# Patient Record
Sex: MALE | Race: WHITE | NOT HISPANIC OR LATINO | URBAN - METROPOLITAN AREA
[De-identification: names, ages, dates, MRNs, and addresses within clinical notes are randomized per-mention and may not be internally consistent; named-entity substitution may affect disease eponyms.]

---

## 2023-03-02 ENCOUNTER — APPOINTMENT (OUTPATIENT)
Dept: URBAN - METROPOLITAN AREA CLINIC 195 | Age: 88
Setting detail: DERMATOLOGY
End: 2023-03-03

## 2023-03-02 VITALS
HEART RATE: 84 BPM | WEIGHT: 220 LBS | RESPIRATION RATE: 16 BRPM | SYSTOLIC BLOOD PRESSURE: 142 MMHG | DIASTOLIC BLOOD PRESSURE: 78 MMHG | TEMPERATURE: 97.2 F | HEIGHT: 70 IN

## 2023-03-02 VITALS — RESPIRATION RATE: 18 BRPM | HEART RATE: 80 BPM | DIASTOLIC BLOOD PRESSURE: 76 MMHG | SYSTOLIC BLOOD PRESSURE: 136 MMHG

## 2023-03-02 DIAGNOSIS — L57.8 OTHER SKIN CHANGES DUE TO CHRONIC EXPOSURE TO NONIONIZING RADIATION: ICD-10-CM

## 2023-03-02 DIAGNOSIS — Z12.83 ENCOUNTER FOR SCREENING FOR MALIGNANT NEOPLASM OF SKIN: ICD-10-CM

## 2023-03-02 PROBLEM — C44.1192 BASAL CELL CARCINOMA OF SKIN OF LEFT LOWER EYELID, INCLUDING CANTHUS: Status: ACTIVE | Noted: 2023-03-02

## 2023-03-02 PROCEDURE — OTHER COUNSELING: OTHER

## 2023-03-02 PROCEDURE — OTHER INCISIONAL BIOPSY WITH FROZEN SECTION: OTHER

## 2023-03-02 PROCEDURE — OTHER MIPS QUALITY: OTHER

## 2023-03-02 PROCEDURE — 88331 PATH CONSLTJ SURG 1 BLK 1SPC: CPT | Mod: 59

## 2023-03-02 PROCEDURE — 17311 MOHS 1 STAGE H/N/HF/G: CPT

## 2023-03-02 PROCEDURE — OTHER SURGICAL DECISION MAKING: OTHER

## 2023-03-02 PROCEDURE — OTHER MOHS SURGERY: OTHER

## 2023-03-02 PROCEDURE — OTHER ASC CONSULTATION: OTHER

## 2023-03-02 PROCEDURE — 99203 OFFICE O/P NEW LOW 30 MIN: CPT | Mod: 25

## 2023-03-02 PROCEDURE — 11106 INCAL BX SKN SINGLE LES: CPT | Mod: E2

## 2023-03-02 NOTE — PROCEDURE: MOHS SURGERY
Oculoplastic Surgeon Procedure Text (A): After obtaining clear surgical margins the patient was sent to oculoplastics for surgical repair.  The patient understands they will receive post-surgical care and follow-up from the referring physician, Dr. Nath at [facility]. Oculoplastic Surgeon Procedure Text (A): After obtaining clear surgical margins the patient was sent to oculoplastics for surgical repair.  The patient understands they will receive post-surgical care and follow-up from the referring physician, Dr. Nath at [facility].

## 2023-03-02 NOTE — HPI: SKIN LESION
How Severe Is Your Skin Lesion?: moderate
Has Your Skin Lesion Been Treated?: not been treated
Is This A New Presentation, Or A Follow-Up?: Skin Lesion Referral
Who Is Your Referring Provider?: Dr Daisha Nath

## 2023-03-02 NOTE — PROCEDURE: MIPS QUALITY
Detail Level: Detailed
Name And Contact Information For Health Care Proxy: Monet Zepeda , wife
Quality 226: Preventive Care And Screening: Tobacco Use: Screening And Cessation Intervention: Patient screened for tobacco use and is an ex/non-smoker
Quality 47: Advance Care Plan: Advance Care Planning discussed and documented; advance care plan or surrogate decision maker documented in the medical record.
Quality 138: Melanoma: Coordination Of Care: A treatment plan was communicated to the physicians providing continuing care within one month of diagnosis outlining: diagnosis, tumor thickness and a plan for surgery or alternate care.
Quality 130: Documentation Of Current Medications In The Medical Record: Current Medications Documented
Quality 137: Melanoma: Continuity Of Care - Recall System: Patient information entered into a recall system that includes: target date for the next exam specified AND a process to follow up with patients regarding missed or unscheduled appointments

## 2023-03-02 NOTE — PROCEDURE: MOHS SURGERY
"Dr Hewitt and PC nurse visited pt today.  Pt sleeping.  No non-verbal indication of pain.  Daughter, Melisa, in room.  Dr Hewitt had spoken with Dr Casarez who requested she visit pt today to discuss Goals of Care.  Dr Hewitt discussed with Melisa whether pt was undergoing dialysis.  Daughter reported pt has not been able to have dialysis because her \"blood pressures have been too low\".   Dr Hewitt explained it was time for some decisions to be made.  Dr Hewitt requested a family meeting tomorrow evening.  She asked Melisa if she thought pt's children would attend.  Melisa stated she would call them and felt that they would.   A tentative time of 6p tomorrow was decided.  I will follow up with Melisa tomorrow to confirm.  " Mustarde Flap Text: The defect edges were debeveled with a #15 scalpel blade.  Given the size, depth and location of the defect and the proximity to free margins a Mustarde flap was deemed most appropriate.  Using a sterile surgical marker, an appropriate flap was drawn incorporating the defect. The area thus outlined was incised with a #15 scalpel blade.  The skin margins were undermined to an appropriate distance in all directions utilizing iris scissors.

## 2023-03-02 NOTE — PROCEDURE: SURGICAL DECISION MAKING
Complexity (Necessary For Coding; Major - 90 Day Global With Some Exceptions; Minor - 10 Day Global): minor
Discussion: Decision for surgery refers to any surgeries performed today, or discussion of treatment in future.  In general, decision for repair is not made until the final extent of the surgical wound is known.
Date Of Surgery - Today Or Tomorrow?: today
Initial Decision For Surgery?: Yes
Risk Assessment Explanation (Does Not Render In The Note): Clinical determination of the probability and/or consequences of an event, such as surgery. Clinical assessment of the level of risk is affected by the nature of the event under consideration for the patient. Modifier 57 is used to indicate an Evaluation and Management (E/M) service resulted in the initial decision to perform surgery either the day before a major surgery (90 day global) or the day of a major surgery.

## 2023-03-02 NOTE — PROCEDURE: INCISIONAL BIOPSY WITH FROZEN SECTION
Notification Instructions: They were advised to call later that day for results.  If however the patient elected to stay, then biopsy results and management options were reviewed in the office same day. denies

## 2023-03-02 NOTE — PROCEDURE: MOHS SURGERY
Had colonoscopy done last year  Marielena Leung MD to get results and the interval to repeat Spiral Flap Text: The defect edges were debeveled with a #15 scalpel blade.  Given the location of the defect, shape of the defect and the proximity to free margins a spiral flap was deemed most appropriate.  Using a sterile surgical marker, an appropriate rotation flap was drawn incorporating the defect and placing the expected incisions within the relaxed skin tension lines where possible. The area thus outlined was incised deep to adipose tissue with a #15 scalpel blade.  The skin margins were undermined to an appropriate distance in all directions utilizing iris scissors.

## 2024-09-30 ENCOUNTER — APPOINTMENT (OUTPATIENT)
Dept: CT IMAGING | Age: 89
DRG: 551 | End: 2024-09-30
Payer: MEDICARE

## 2024-09-30 ENCOUNTER — APPOINTMENT (OUTPATIENT)
Dept: CT IMAGING | Age: 89
DRG: 551 | End: 2024-09-30
Attending: EMERGENCY MEDICINE
Payer: MEDICARE

## 2024-09-30 ENCOUNTER — HOSPITAL ENCOUNTER (INPATIENT)
Age: 89
LOS: 1 days | Discharge: HOSPICE | DRG: 551 | End: 2024-10-04
Attending: EMERGENCY MEDICINE | Admitting: INTERNAL MEDICINE
Payer: MEDICARE

## 2024-09-30 DIAGNOSIS — S12.100A CLOSED ODONTOID FRACTURE, INITIAL ENCOUNTER (HCC): Primary | ICD-10-CM

## 2024-09-30 DIAGNOSIS — I63.9 CEREBROVASCULAR ACCIDENT (CVA), UNSPECIFIED MECHANISM (HCC): ICD-10-CM

## 2024-09-30 PROBLEM — R29.90 STROKE-LIKE SYMPTOM: Status: ACTIVE | Noted: 2024-09-30

## 2024-09-30 LAB
ALBUMIN SERPL-MCNC: 3.4 G/DL (ref 3.5–5.2)
ALP SERPL-CCNC: 69 U/L (ref 40–129)
ALT SERPL-CCNC: 15 U/L (ref 0–40)
AMMONIA PLAS-SCNC: 14 UMOL/L (ref 16–60)
ANION GAP SERPL CALCULATED.3IONS-SCNC: 14 MMOL/L (ref 7–16)
ANION GAP SERPL CALCULATED.3IONS-SCNC: 15 MMOL/L (ref 7–16)
AST SERPL-CCNC: 27 U/L (ref 0–39)
BASOPHILS # BLD: 0.03 K/UL (ref 0–0.2)
BASOPHILS NFR BLD: 1 % (ref 0–2)
BILIRUB SERPL-MCNC: 0.5 MG/DL (ref 0–1.2)
BUN SERPL-MCNC: 35 MG/DL (ref 6–23)
BUN SERPL-MCNC: 35 MG/DL (ref 6–23)
CALCIUM SERPL-MCNC: 9.5 MG/DL (ref 8.6–10.2)
CALCIUM SERPL-MCNC: 9.5 MG/DL (ref 8.6–10.2)
CHLORIDE SERPL-SCNC: 105 MMOL/L (ref 98–107)
CHLORIDE SERPL-SCNC: 106 MMOL/L (ref 98–107)
CO2 SERPL-SCNC: 23 MMOL/L (ref 22–29)
CO2 SERPL-SCNC: 25 MMOL/L (ref 22–29)
CREAT SERPL-MCNC: 2.7 MG/DL (ref 0.7–1.2)
CREAT SERPL-MCNC: 2.8 MG/DL (ref 0.7–1.2)
EOSINOPHIL # BLD: 0.09 K/UL (ref 0.05–0.5)
EOSINOPHILS RELATIVE PERCENT: 2 % (ref 0–6)
ERYTHROCYTE [DISTWIDTH] IN BLOOD BY AUTOMATED COUNT: 13 % (ref 11.5–15)
ERYTHROCYTE [DISTWIDTH] IN BLOOD BY AUTOMATED COUNT: 13.2 % (ref 11.5–15)
GFR, ESTIMATED: 21 ML/MIN/1.73M2
GFR, ESTIMATED: 22 ML/MIN/1.73M2
GLUCOSE SERPL-MCNC: 105 MG/DL (ref 74–99)
GLUCOSE SERPL-MCNC: 105 MG/DL (ref 74–99)
HCT VFR BLD AUTO: 32.6 % (ref 37–54)
HCT VFR BLD AUTO: 34.3 % (ref 37–54)
HGB BLD-MCNC: 10.7 G/DL (ref 12.5–16.5)
HGB BLD-MCNC: 11 G/DL (ref 12.5–16.5)
IMM GRANULOCYTES # BLD AUTO: <0.03 K/UL (ref 0–0.58)
IMM GRANULOCYTES NFR BLD: 0 % (ref 0–5)
LYMPHOCYTES NFR BLD: 1.17 K/UL (ref 1.5–4)
LYMPHOCYTES RELATIVE PERCENT: 25 % (ref 20–42)
MCH RBC QN AUTO: 33.2 PG (ref 26–35)
MCH RBC QN AUTO: 33.5 PG (ref 26–35)
MCHC RBC AUTO-ENTMCNC: 32.1 G/DL (ref 32–34.5)
MCHC RBC AUTO-ENTMCNC: 32.8 G/DL (ref 32–34.5)
MCV RBC AUTO: 102.2 FL (ref 80–99.9)
MCV RBC AUTO: 103.6 FL (ref 80–99.9)
MONOCYTES NFR BLD: 0.35 K/UL (ref 0.1–0.95)
MONOCYTES NFR BLD: 8 % (ref 2–12)
NEUTROPHILS NFR BLD: 65 % (ref 43–80)
NEUTS SEG NFR BLD: 3.04 K/UL (ref 1.8–7.3)
PLATELET # BLD AUTO: 168 K/UL (ref 130–450)
PLATELET # BLD AUTO: 174 K/UL (ref 130–450)
PMV BLD AUTO: 10.8 FL (ref 7–12)
PMV BLD AUTO: 10.9 FL (ref 7–12)
POTASSIUM SERPL-SCNC: 3.5 MMOL/L (ref 3.5–5)
POTASSIUM SERPL-SCNC: 3.7 MMOL/L (ref 3.5–5)
PROT SERPL-MCNC: 7.5 G/DL (ref 6.4–8.3)
RBC # BLD AUTO: 3.19 M/UL (ref 3.8–5.8)
RBC # BLD AUTO: 3.31 M/UL (ref 3.8–5.8)
SODIUM SERPL-SCNC: 144 MMOL/L (ref 132–146)
SODIUM SERPL-SCNC: 144 MMOL/L (ref 132–146)
TSH SERPL DL<=0.05 MIU/L-ACNC: 2.17 UIU/ML (ref 0.27–4.2)
WBC OTHER # BLD: 4.7 K/UL (ref 4.5–11.5)
WBC OTHER # BLD: 5.7 K/UL (ref 4.5–11.5)

## 2024-09-30 PROCEDURE — 70498 CT ANGIOGRAPHY NECK: CPT

## 2024-09-30 PROCEDURE — 80048 BASIC METABOLIC PNL TOTAL CA: CPT

## 2024-09-30 PROCEDURE — 70496 CT ANGIOGRAPHY HEAD: CPT

## 2024-09-30 PROCEDURE — 2580000003 HC RX 258: Performed by: EMERGENCY MEDICINE

## 2024-09-30 PROCEDURE — 85027 COMPLETE CBC AUTOMATED: CPT

## 2024-09-30 PROCEDURE — 99285 EMERGENCY DEPT VISIT HI MDM: CPT

## 2024-09-30 PROCEDURE — 70450 CT HEAD/BRAIN W/O DYE: CPT

## 2024-09-30 PROCEDURE — 99223 1ST HOSP IP/OBS HIGH 75: CPT | Performed by: SURGERY

## 2024-09-30 PROCEDURE — 6360000002 HC RX W HCPCS: Performed by: EMERGENCY MEDICINE

## 2024-09-30 PROCEDURE — 99222 1ST HOSP IP/OBS MODERATE 55: CPT | Performed by: NEUROLOGICAL SURGERY

## 2024-09-30 PROCEDURE — 80053 COMPREHEN METABOLIC PANEL: CPT

## 2024-09-30 PROCEDURE — 0042T CT BRAIN PERFUSION: CPT

## 2024-09-30 PROCEDURE — G0378 HOSPITAL OBSERVATION PER HR: HCPCS

## 2024-09-30 PROCEDURE — 93005 ELECTROCARDIOGRAM TRACING: CPT | Performed by: EMERGENCY MEDICINE

## 2024-09-30 PROCEDURE — 96372 THER/PROPH/DIAG INJ SC/IM: CPT

## 2024-09-30 PROCEDURE — 6360000004 HC RX CONTRAST MEDICATION: Performed by: RADIOLOGY

## 2024-09-30 PROCEDURE — 72125 CT NECK SPINE W/O DYE: CPT

## 2024-09-30 RX ORDER — HALOPERIDOL 5 MG/ML
10 INJECTION INTRAMUSCULAR ONCE
Status: COMPLETED | OUTPATIENT
Start: 2024-09-30 | End: 2024-09-30

## 2024-09-30 RX ORDER — 0.9 % SODIUM CHLORIDE 0.9 %
1000 INTRAVENOUS SOLUTION INTRAVENOUS ONCE
Status: COMPLETED | OUTPATIENT
Start: 2024-09-30 | End: 2024-09-30

## 2024-09-30 RX ORDER — TAMSULOSIN HYDROCHLORIDE 0.4 MG/1
0.4 CAPSULE ORAL DAILY
COMMUNITY

## 2024-09-30 RX ORDER — IOPAMIDOL 755 MG/ML
100 INJECTION, SOLUTION INTRAVASCULAR
Status: COMPLETED | OUTPATIENT
Start: 2024-09-30 | End: 2024-09-30

## 2024-09-30 RX ORDER — DIVALPROEX SODIUM 125 MG/1
125 TABLET, DELAYED RELEASE ORAL 3 TIMES DAILY
Status: ON HOLD | COMMUNITY
End: 2024-10-04 | Stop reason: HOSPADM

## 2024-09-30 RX ORDER — PANTOPRAZOLE SODIUM 40 MG/1
40 TABLET, DELAYED RELEASE ORAL DAILY
COMMUNITY

## 2024-09-30 RX ORDER — LORAZEPAM 2 MG/ML
2 INJECTION INTRAMUSCULAR ONCE
Status: COMPLETED | OUTPATIENT
Start: 2024-09-30 | End: 2024-09-30

## 2024-09-30 RX ORDER — LEVETIRACETAM 500 MG/1
500 TABLET ORAL DAILY
Status: ON HOLD | COMMUNITY
End: 2024-10-04 | Stop reason: HOSPADM

## 2024-09-30 RX ORDER — ACETAMINOPHEN 325 MG/1
650 TABLET ORAL EVERY 4 HOURS PRN
COMMUNITY

## 2024-09-30 RX ORDER — MIRTAZAPINE 7.5 MG/1
7.5 TABLET, FILM COATED ORAL NIGHTLY
COMMUNITY

## 2024-09-30 RX ORDER — LORAZEPAM 0.5 MG/1
0.5 TABLET ORAL EVERY 4 HOURS PRN
Status: ON HOLD | COMMUNITY
End: 2024-10-04 | Stop reason: HOSPADM

## 2024-09-30 RX ADMIN — SODIUM CHLORIDE 1000 ML: 9 INJECTION, SOLUTION INTRAVENOUS at 22:00

## 2024-09-30 RX ADMIN — IOPAMIDOL 100 ML: 755 INJECTION, SOLUTION INTRAVENOUS at 20:58

## 2024-09-30 RX ADMIN — LORAZEPAM 2 MG: 2 INJECTION INTRAMUSCULAR; INTRAVENOUS at 23:41

## 2024-09-30 RX ADMIN — HALOPERIDOL LACTATE 10 MG: 5 INJECTION, SOLUTION INTRAMUSCULAR at 21:20

## 2024-09-30 RX ADMIN — HALOPERIDOL LACTATE 10 MG: 5 INJECTION, SOLUTION INTRAMUSCULAR at 23:39

## 2024-09-30 ASSESSMENT — PAIN - FUNCTIONAL ASSESSMENT
PAIN_FUNCTIONAL_ASSESSMENT: NONE - DENIES PAIN
PAIN_FUNCTIONAL_ASSESSMENT: NONE - DENIES PAIN

## 2024-09-30 NOTE — PROGRESS NOTES
Call placed to [facility]. Spoke to nursing staff familiar with patient. Jorge Luis usually ambulates independently and frequently making rounds in the halls, his speech is usually clear, with no facial droop. He is able to follow commands even though his baseline is Alert and Oriented to Self only. Patient's pupils are equal round, and reactive to light and accommodation.     Estrellita Moctezuma RN, BSN

## 2024-09-30 NOTE — CONSULTS
Normal  Right arm Normal  Left leg Normal  Right leg Normal    Capillary refill   Left arm normal  Right arm normal  Left leg normal   Right leg normal      Procedures in ED:  None    Radiology:     CT CERVICAL SPINE WO CONTRAST    Result Date: 9/30/2024  EXAMINATION: CT OF THE CERVICAL SPINE WITHOUT CONTRAST 9/30/2024 3:21 pm TECHNIQUE: CT of the cervical spine was performed without the administration of intravenous contrast. Multiplanar reformatted images are provided for review. Automated exposure control, iterative reconstruction, and/or weight based adjustment of the mA/kV was utilized to reduce the radiation dose to as low as reasonably achievable. COMPARISON: None. HISTORY: ORDERING SYSTEM PROVIDED HISTORY: ro fx TECHNOLOGIST PROVIDED HISTORY: Reason for exam:->ro fx Decision Support Exception - unselect if not a suspected or confirmed emergency medical condition->Emergency Medical Condition (MA) What reading provider will be dictating this exam?->CRC FINDINGS: BONES/ALIGNMENT: Type 3 odontoid fracture.  No significant displacement. DEGENERATIVE CHANGES: Multilevel degenerative changes.  Diffuse idiopathic skeletal hyperostosis.  Osteopenia. SOFT TISSUES: There is no prevertebral soft tissue swelling.     Type 3 odontoid fracture.     CT HEAD WO CONTRAST    Result Date: 9/30/2024  EXAMINATION: CT OF THE HEAD WITHOUT CONTRAST  9/30/2024 3:21 pm TECHNIQUE: CT of the head was performed without the administration of intravenous contrast. Automated exposure control, iterative reconstruction, and/or weight based adjustment of the mA/kV was utilized to reduce the radiation dose to as low as reasonably achievable. COMPARISON: None. HISTORY: ORDERING SYSTEM PROVIDED HISTORY: fall at [facility] TECHNOLOGIST PROVIDED HISTORY: Has a \"code stroke\" or \"stroke alert\" been called?->No Reason for exam:->fall at [facility] Decision Support Exception - unselect if not a suspected or confirmed emergency medical condition->Emergency Medical

## 2024-09-30 NOTE — PROGRESS NOTES
Updated SROC as well as Dr. Marti of patient's assessment findings not as his baseline.     Estrellita Moctezuma RN, BSN .

## 2024-09-30 NOTE — ED PROVIDER NOTES
89-year-old male presenting from nursing facility.  He has dementia.  Is oriented to his person only.  He otherwise cannot provide any history and no reliable information about why he is in the emergency department today.  No distress, awake and alert, wearing cervical collar at this time.   nursing home reported that he had a fall at the facility and sent in for evaluation.         No family history on file.  No past surgical history on file.    Review of Systems   Unable to perform ROS: Dementia        Physical Exam  Constitutional:       General: He is not in acute distress.     Appearance: He is well-developed.   HENT:      Head: Normocephalic and atraumatic.   Eyes:      Pupils: Pupils are equal, round, and reactive to light.   Neck:      Thyroid: No thyromegaly.      Comments: Cervical collar in place  Cardiovascular:      Rate and Rhythm: Normal rate and regular rhythm.   Pulmonary:      Effort: Pulmonary effort is normal. No respiratory distress.      Breath sounds: Normal breath sounds. No wheezing.   Abdominal:      General: There is no distension.      Palpations: Abdomen is soft. There is no mass.      Tenderness: There is no abdominal tenderness. There is no guarding or rebound.   Musculoskeletal:         General: No tenderness. Normal range of motion.      Comments: Left hand amputation previously   Skin:     General: Skin is warm and dry.      Findings: No erythema.   Neurological:      Mental Status: He is alert. Mental status is at baseline. He is disoriented.      Cranial Nerves: No cranial nerve deficit.   Psychiatric:         Mood and Affect: Mood normal.          Procedures     MDM       History From: Nursing home    CC/HPI Summary, DDx, ED Course, Reassessment, Tests Considered, Patient expectation:   89-year-old male presenting from the nursing home.  Had a fall.  Is in a cervical collar upon arrival.  He is able to tell me his name only and cannot provide any other verbal information.  He  CTA of the neck shows high-grade stenosis of the origin of the dominant   left vertebral artery.   4. The tiny caliber non dominant right vertebral artery is occluded at its   origin and in the mid cervical spine.  Reconstitution at the C3 level with   flow extending from the C3 level into the brain.   5. 20% stenosis of the proximal right internal carotid artery using NASCET   criteria.   6. 50% stenosis of the proximal left internal carotid artery using NASCET   criteria.   7. No perfusion abnormality.         CTA HEAD W CONTRAST   Final Result   1. CTA of the head chest focal 50% stenosis of the proximal end of an M2   branch of the left MCA.   2. No additional stenosis or occlusion of the proximal intracranial arteries.   3. CTA of the neck shows high-grade stenosis of the origin of the dominant   left vertebral artery.   4. The tiny caliber non dominant right vertebral artery is occluded at its   origin and in the mid cervical spine.  Reconstitution at the C3 level with   flow extending from the C3 level into the brain.   5. 20% stenosis of the proximal right internal carotid artery using NASCET   criteria.   6. 50% stenosis of the proximal left internal carotid artery using NASCET   criteria.   7. No perfusion abnormality.         CT BRAIN PERFUSION   Final Result   1. CTA of the head chest focal 50% stenosis of the proximal end of an M2   branch of the left MCA.   2. No additional stenosis or occlusion of the proximal intracranial arteries.   3. CTA of the neck shows high-grade stenosis of the origin of the dominant   left vertebral artery.   4. The tiny caliber non dominant right vertebral artery is occluded at its   origin and in the mid cervical spine.  Reconstitution at the C3 level with   flow extending from the C3 level into the brain.   5. 20% stenosis of the proximal right internal carotid artery using NASCET   criteria.   6. 50% stenosis of the proximal left internal carotid artery using NASCET

## 2024-10-01 ENCOUNTER — APPOINTMENT (OUTPATIENT)
Age: 89
DRG: 551 | End: 2024-10-01
Attending: FAMILY MEDICINE
Payer: MEDICARE
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
## 2024-10-01 LAB
ECHO AO ASC DIAM: 3.3 CM
ECHO AO ASCENDING AORTA INDEX: 1.58 CM/M2
ECHO AV AREA PEAK VELOCITY: 3.2 CM2
ECHO AV AREA VTI: 3.6 CM2
ECHO AV AREA/BSA PEAK VELOCITY: 1.5 CM2/M2
ECHO AV AREA/BSA VTI: 1.7 CM2/M2
ECHO AV CUSP MM: 2 CM
ECHO AV MEAN GRADIENT: 5 MMHG
ECHO AV MEAN VELOCITY: 1 M/S
ECHO AV PEAK GRADIENT: 8 MMHG
ECHO AV PEAK VELOCITY: 1.4 M/S
ECHO AV VELOCITY RATIO: 0.71
ECHO AV VTI: 26.8 CM
ECHO BSA: 2.14 M2
ECHO LA DIAMETER INDEX: 1.34 CM/M2
ECHO LA DIAMETER: 2.8 CM
ECHO LA VOL A-L A2C: 33 ML (ref 18–58)
ECHO LA VOL A-L A4C: 33 ML (ref 18–58)
ECHO LA VOL BP: 37 ML (ref 18–58)
ECHO LA VOL MOD A2C: 31 ML (ref 18–58)
ECHO LA VOL MOD A4C: 31 ML (ref 18–58)
ECHO LA VOL/BSA BIPLANE: 18 ML/M2 (ref 16–34)
ECHO LA VOLUME AREA LENGTH: 39 ML
ECHO LA VOLUME INDEX A-L A2C: 16 ML/M2 (ref 16–34)
ECHO LA VOLUME INDEX A-L A4C: 16 ML/M2 (ref 16–34)
ECHO LA VOLUME INDEX AREA LENGTH: 19 ML/M2 (ref 16–34)
ECHO LA VOLUME INDEX MOD A2C: 15 ML/M2 (ref 16–34)
ECHO LA VOLUME INDEX MOD A4C: 15 ML/M2 (ref 16–34)
ECHO LV EF PHYSICIAN: 55 %
ECHO LV FRACTIONAL SHORTENING: 28 % (ref 28–44)
ECHO LV INTERNAL DIMENSION DIASTOLE INDEX: 2.2 CM/M2
ECHO LV INTERNAL DIMENSION DIASTOLIC: 4.6 CM (ref 4.2–5.9)
ECHO LV INTERNAL DIMENSION SYSTOLIC INDEX: 1.58 CM/M2
ECHO LV INTERNAL DIMENSION SYSTOLIC: 3.3 CM
ECHO LV IVSD: 1.3 CM (ref 0.6–1)
ECHO LV MASS 2D: 205 G (ref 88–224)
ECHO LV MASS INDEX 2D: 98.1 G/M2 (ref 49–115)
ECHO LV POSTERIOR WALL DIASTOLIC: 1.1 CM (ref 0.6–1)
ECHO LV RELATIVE WALL THICKNESS RATIO: 0.48
ECHO LVOT AREA: 4.5 CM2
ECHO LVOT AV VTI INDEX: 0.78
ECHO LVOT DIAM: 2.4 CM
ECHO LVOT MEAN GRADIENT: 2 MMHG
ECHO LVOT PEAK GRADIENT: 4 MMHG
ECHO LVOT PEAK VELOCITY: 1 M/S
ECHO LVOT STROKE VOLUME INDEX: 45 ML/M2
ECHO LVOT SV: 94 ML
ECHO LVOT VTI: 20.8 CM
ECHO MV AREA PHT: 5.2 CM2
ECHO MV AREA VTI: 4.3 CM2
ECHO MV E DECELERATION TIME (DT): 103.9 MS
ECHO MV LVOT VTI INDEX: 1.06
ECHO MV MAX VELOCITY: 1.7 M/S
ECHO MV MEAN GRADIENT: 4 MMHG
ECHO MV MEAN VELOCITY: 0.9 M/S
ECHO MV PEAK GRADIENT: 11 MMHG
ECHO MV PRESSURE HALF TIME (PHT): 42 MS
ECHO MV VTI: 22.1 CM
ECHO PV MAX VELOCITY: 1.1 M/S
ECHO PV MEAN GRADIENT: 3 MMHG
ECHO PV MEAN VELOCITY: 0.8 M/S
ECHO PV PEAK GRADIENT: 4 MMHG
ECHO PV VTI: 15.6 CM
ECHO RV TAPSE: 1.8 CM (ref 1.7–?)
EKG ATRIAL RATE: 93 BPM
EKG P AXIS: 56 DEGREES
EKG P-R INTERVAL: 150 MS
EKG Q-T INTERVAL: 410 MS
EKG QRS DURATION: 140 MS
EKG QTC CALCULATION (BAZETT): 509 MS
EKG R AXIS: -59 DEGREES
EKG T AXIS: -2 DEGREES
EKG VENTRICULAR RATE: 93 BPM
FOLATE SERPL-MCNC: 7.9 NG/ML (ref 4.8–24.2)
T PALLIDUM AB SER QL HA: NONREACTIVE
VIT B12 SERPL-MCNC: 357 PG/ML (ref 211–946)

## 2024-10-01 PROCEDURE — G0378 HOSPITAL OBSERVATION PER HR: HCPCS

## 2024-10-01 PROCEDURE — 2580000003 HC RX 258: Performed by: FAMILY MEDICINE

## 2024-10-01 PROCEDURE — 6370000000 HC RX 637 (ALT 250 FOR IP): Performed by: FAMILY MEDICINE

## 2024-10-01 PROCEDURE — 96372 THER/PROPH/DIAG INJ SC/IM: CPT

## 2024-10-01 PROCEDURE — 93306 TTE W/DOPPLER COMPLETE: CPT | Performed by: INTERNAL MEDICINE

## 2024-10-01 PROCEDURE — 6360000002 HC RX W HCPCS: Performed by: FAMILY MEDICINE

## 2024-10-01 PROCEDURE — 93010 ELECTROCARDIOGRAM REPORT: CPT | Performed by: INTERNAL MEDICINE

## 2024-10-01 PROCEDURE — 99222 1ST HOSP IP/OBS MODERATE 55: CPT | Performed by: SURGERY

## 2024-10-01 PROCEDURE — 93306 TTE W/DOPPLER COMPLETE: CPT

## 2024-10-01 PROCEDURE — 92523 SPEECH SOUND LANG COMPREHEN: CPT

## 2024-10-01 PROCEDURE — 99222 1ST HOSP IP/OBS MODERATE 55: CPT | Performed by: FAMILY MEDICINE

## 2024-10-01 RX ORDER — ATORVASTATIN CALCIUM 40 MG/1
80 TABLET, FILM COATED ORAL NIGHTLY
Status: DISCONTINUED | OUTPATIENT
Start: 2024-10-01 | End: 2024-10-04 | Stop reason: HOSPADM

## 2024-10-01 RX ORDER — SODIUM CHLORIDE 9 MG/ML
INJECTION, SOLUTION INTRAVENOUS CONTINUOUS
Status: ACTIVE | OUTPATIENT
Start: 2024-10-01 | End: 2024-10-01

## 2024-10-01 RX ORDER — VITAMIN B COMPLEX
1000 TABLET ORAL DAILY
Status: DISCONTINUED | OUTPATIENT
Start: 2024-10-01 | End: 2024-10-04 | Stop reason: HOSPADM

## 2024-10-01 RX ORDER — LORAZEPAM 0.5 MG/1
0.5 TABLET ORAL EVERY 4 HOURS PRN
Status: DISCONTINUED | OUTPATIENT
Start: 2024-10-01 | End: 2024-10-02

## 2024-10-01 RX ORDER — ASPIRIN 81 MG/1
81 TABLET, CHEWABLE ORAL DAILY
Status: DISCONTINUED | OUTPATIENT
Start: 2024-10-01 | End: 2024-10-04 | Stop reason: HOSPADM

## 2024-10-01 RX ORDER — POLYETHYLENE GLYCOL 3350 17 G/17G
17 POWDER, FOR SOLUTION ORAL DAILY PRN
Status: DISCONTINUED | OUTPATIENT
Start: 2024-10-01 | End: 2024-10-04 | Stop reason: HOSPADM

## 2024-10-01 RX ORDER — ENOXAPARIN SODIUM 100 MG/ML
30 INJECTION SUBCUTANEOUS DAILY
Status: DISCONTINUED | OUTPATIENT
Start: 2024-10-01 | End: 2024-10-04 | Stop reason: HOSPADM

## 2024-10-01 RX ORDER — PANTOPRAZOLE SODIUM 40 MG/1
40 TABLET, DELAYED RELEASE ORAL DAILY
Status: DISCONTINUED | OUTPATIENT
Start: 2024-10-01 | End: 2024-10-04 | Stop reason: HOSPADM

## 2024-10-01 RX ORDER — ONDANSETRON 4 MG/1
4 TABLET, ORALLY DISINTEGRATING ORAL EVERY 8 HOURS PRN
Status: DISCONTINUED | OUTPATIENT
Start: 2024-10-01 | End: 2024-10-04 | Stop reason: HOSPADM

## 2024-10-01 RX ORDER — SODIUM CHLORIDE 9 MG/ML
INJECTION, SOLUTION INTRAVENOUS PRN
Status: DISCONTINUED | OUTPATIENT
Start: 2024-10-01 | End: 2024-10-04 | Stop reason: HOSPADM

## 2024-10-01 RX ORDER — MIRTAZAPINE 15 MG/1
7.5 TABLET, FILM COATED ORAL NIGHTLY
Status: DISCONTINUED | OUTPATIENT
Start: 2024-10-01 | End: 2024-10-04 | Stop reason: HOSPADM

## 2024-10-01 RX ORDER — TAMSULOSIN HYDROCHLORIDE 0.4 MG/1
0.4 CAPSULE ORAL DAILY
Status: DISCONTINUED | OUTPATIENT
Start: 2024-10-01 | End: 2024-10-04 | Stop reason: HOSPADM

## 2024-10-01 RX ORDER — SODIUM CHLORIDE 0.9 % (FLUSH) 0.9 %
5-40 SYRINGE (ML) INJECTION EVERY 12 HOURS SCHEDULED
Status: DISCONTINUED | OUTPATIENT
Start: 2024-10-01 | End: 2024-10-04 | Stop reason: HOSPADM

## 2024-10-01 RX ORDER — LEVETIRACETAM 500 MG/1
500 TABLET ORAL DAILY
Status: DISCONTINUED | OUTPATIENT
Start: 2024-10-01 | End: 2024-10-02

## 2024-10-01 RX ORDER — DIVALPROEX SODIUM 125 MG/1
125 TABLET, DELAYED RELEASE ORAL 3 TIMES DAILY
Status: DISCONTINUED | OUTPATIENT
Start: 2024-10-01 | End: 2024-10-04 | Stop reason: HOSPADM

## 2024-10-01 RX ORDER — ASPIRIN 300 MG/1
300 SUPPOSITORY RECTAL DAILY
Status: DISCONTINUED | OUTPATIENT
Start: 2024-10-01 | End: 2024-10-04 | Stop reason: HOSPADM

## 2024-10-01 RX ORDER — SODIUM CHLORIDE 0.9 % (FLUSH) 0.9 %
5-40 SYRINGE (ML) INJECTION PRN
Status: DISCONTINUED | OUTPATIENT
Start: 2024-10-01 | End: 2024-10-04 | Stop reason: HOSPADM

## 2024-10-01 RX ORDER — ONDANSETRON 2 MG/ML
4 INJECTION INTRAMUSCULAR; INTRAVENOUS EVERY 6 HOURS PRN
Status: DISCONTINUED | OUTPATIENT
Start: 2024-10-01 | End: 2024-10-04 | Stop reason: HOSPADM

## 2024-10-01 RX ADMIN — ATORVASTATIN CALCIUM 80 MG: 40 TABLET, FILM COATED ORAL at 21:14

## 2024-10-01 RX ADMIN — LORAZEPAM 0.5 MG: 0.5 TABLET ORAL at 21:25

## 2024-10-01 RX ADMIN — SODIUM CHLORIDE 1000 ML: 9 INJECTION, SOLUTION INTRAVENOUS at 21:14

## 2024-10-01 RX ADMIN — MIRTAZAPINE 7.5 MG: 15 TABLET, FILM COATED ORAL at 21:14

## 2024-10-01 RX ADMIN — ENOXAPARIN SODIUM 30 MG: 100 INJECTION SUBCUTANEOUS at 09:40

## 2024-10-01 RX ADMIN — SODIUM CHLORIDE: 9 INJECTION, SOLUTION INTRAVENOUS at 05:54

## 2024-10-01 RX ADMIN — SODIUM CHLORIDE, PRESERVATIVE FREE 10 ML: 5 INJECTION INTRAVENOUS at 21:16

## 2024-10-01 ASSESSMENT — PAIN - FUNCTIONAL ASSESSMENT: PAIN_FUNCTIONAL_ASSESSMENT: NONE - DENIES PAIN

## 2024-10-01 NOTE — CONSULTS
[facility]              [address] [place] [address]                              CONSULTATION      PATIENT NAME: ELIOT BURGESS                : 1933  MED REC NO: [id number]                        ROOM: 22  ACCOUNT NO: [id number]                       ADMIT DATE: 2024  PROVIDER: Alejandro Badillo MD    NEUROSURGERY CONSULT    CONSULT DATE: 2024      REASON FOR CONSULT:  Odontoid fracture.    HISTORY OF PRESENT ILLNESS:  The patient is a 89-year-old gentleman who apparently fell and was subsequently brought to [facility] where CT scan of his cervical spine showed a type 2 odontoid fracture.  Neurosurgery service was consulted.  Interview and examination are somewhat difficult to complete as the patient is hard of hearing, appears to have some underlying dementia, and there is no history in the medical records.    PAST MEDICAL HISTORY:  Unknown.    FAMILY HISTORY:  Unknown.    SOCIAL HISTORY:  Unknown.    ALLERGIES:  UNKNOWN.      PAST SURGICAL HISTORY:  Left upper extremity amputation.    REVIEW OF SYSTEMS:  I am unable to complete a 14-point review of systems because of the patient's current medical condition and his inability to answer questions appropriately.    PHYSICAL EXAM:  VITAL SIGNS:  He is currently afebrile with a T current of 36.4 degrees Celsius, pulse 84, blood pressure 108/57.  GENERAL:  He is resting in bed.  Does not appear to be in any acute distress.  Appears his stated age.  HEENT:  His head is normocephalic and atraumatic.  Pupils are 4 to 3 mm and reactive.  He has no drainage out his eyes, ears, nose, or throat.  SKIN:  Warm and dry.  MUSCULOSKELETAL:  He has good range of motion in his bilateral upper and lower extremities.  ABDOMEN:  Soft, nontender, and nondistended.  RESPIRATORY:  He is not using any accessory muscles of respiration.  NEUROLOGIC:  The rest of his neurologic exam, he is awake and alert.

## 2024-10-01 NOTE — ED NOTES
Stroke/ Bethany Alert Time:Bethany Alert @2003      Time Neurologist called:  :    Time CT Notified: 2003      BRAIN alert time: (If applicable)

## 2024-10-01 NOTE — CARE COORDINATION
Social Work/ Transition of Care:    Pt presents to the ED secondary to a fall at [facility].  Pt has hx of dementia.  Pt is admitted observation with stroke like symptom.  Pt has consults for neurology and trauma surgery.  Pt also has an MRI of the brain ordered.    SW met with pt and wife.  Pt was lying in bed, c collar in place, confused, moving arms and legs.  Sitter at bedside.  Pt's wife reports pt was at [facility] for 2 weeks before becoming physically aggressive with staff last Thursday and then was sent to the ED at [facility].  Pt was then sent to [facility].  Pt had a fall at [facility] yesterday and was sent to the ED at this hospital.    Pt's wife reports pt's PCP is Dr Sergio Joseph in [facility].   Call placed to Shawna,  for [facility], to sent pt's information, history, and medication list.    JUAN/MARISOL to follow.            Case Management Assessment  Initial Evaluation    Date/Time of Evaluation: 10/1/2024 11:56 AM  Assessment Completed by: JORGE Neff    If patient is discharged prior to next notation, then this note serves as note for discharge by case management.    Patient Name: Jorge Luis Chowdhury                   YOB: 1933  Diagnosis: Stroke-like symptom [R29.90]                   Date / Time: 9/30/2024  1:05 PM    Patient Admission Status: Observation   Readmission Risk (Low < 19, Mod (19-27), High > 27): No data recorded  Current PCP: Sergio Joseph MD  PCP verified by ? Yes    Chart Reviewed: Yes      History Provided by: Significant Other  Patient Orientation: Sedated    Patient Cognition: Severely Impaired    Hospitalization in the last 30 days (Readmission):  No    If yes, Readmission Assessment in  Navigator will be completed.    Advance Directives:      Code Status: Full Code   Patient's Primary Decision Maker is:        Discharge Planning:    Patient lives with:   Type of Home:    Primary Care

## 2024-10-01 NOTE — ED NOTES
Patient is unable to be verbally redirected at this time. Patient is trying to get out of bed / remove cervical collar and hits staff when redirection is attempted.

## 2024-10-01 NOTE — ED NOTES
Radiology Procedure Waiver   Name: Jorge Luis Chowdhury  : 1933  MRN: [id number]    Date:  24    Time: 8:49 PM EDT    Benefits of immediately proceeding with Radiology exam(s) without pre-testing outweigh the risks or are not indicated as specified below and therefore the following is/are being waived:    [] Pregnancy test   [] Patients LMP on-time and regular.   [] Patient had Tubal Ligation or has other Contraception Device.   [] Patient  is Menopausal or Premenarcheal.    [] Patient had Full or Partial Hysterectomy.    [] Protocol for Iodine allergy    [] MRI Questionnaire     [x] BUN/Creatinine   [] Patient age w/no hx of renal dysfunction.   [] Patient on Dialysis.   [] Recent Normal Labs.  Electronically signed by Ant Marti DO on 24 at 8:49 PM EDT        Benefit outweighs risk     Ant Marti DO  [phone]

## 2024-10-01 NOTE — PROGRESS NOTES
[facility]   ATTENDING PHYSICIAN PROGRESS NOTE     I have personally examined the patient, personally reviewed the record, and personally discussed the case with the resident. I have personally reviewed all relevant labs and imaging data. I am actively managing this patient's medications.  Please refer to the resident's note. I agree with the assessment and plan with the following corrections/additions. The following summarizes my clinical findings and independent assessment.     CC: fall    Pt with mumbling incomprehensible speech.    Asleep but arousable  Follows commands  Confused  Mumbling incomprehensibly  Hrt:  regular  Lungs:  clear  Abd:  soft; BS active; NT/ND  Skin:  warm/dry  C-collar in place    Patient Active Problem List    Diagnosis Date Noted    Closed fracture of odontoid process of axis (HCC) 09/30/2024    Stroke-like symptom 09/30/2024     S/p fall  C1/2 fx--maintain C-collar  Altered LOC--monitor neuro exam  Check swallow eval  PT/OT evals  DVT risk--PCDs/lovenox    Gurjit Gutierrez MD, FACS  10/1/2024  3:27 PM

## 2024-10-01 NOTE — PROGRESS NOTES
Patient has gfr of 21, patient will need waiver before he can have contrast studies done, thank you.

## 2024-10-01 NOTE — PROGRESS NOTES
SPEECH/LANGUAGE PATHOLOGY  SPEECH/LANGUAGE/COGNITIVE EVALUATION   and PLAN OF CARE      PATIENT NAME:  Jorge Luis Chowdhury  (male)     MRN:  [id number]    :  1933  (89 y.o.)  STATUS:  Emergency visit:  Room 22   TODAY'S DATE:  10/1/2024  REFERRING PROVIDER:      SPECIFIC PROVIDER ORDER: speech language pathology (SLP) eval and treat  Date of order:  10-1-24  REASON FOR REFERRAL:   CVA  EVALUATING THERAPIST: DIPESH Yadav    ADMITTING DIAGNOSIS: Stroke-like symptom [R29.90]  Cerebrovascular accident (CVA), unspecified mechanism (HCC) [I63.9]    VISIT DIAGNOSIS:   Visit Diagnoses         Codes    Cerebrovascular accident (CVA), unspecified mechanism (HCC)    -  Primary I63.9               SPEECH THERAPY  PLAN OF CARE   The speech therapy  POC is established based on physician order, speech pathology diagnosis and results of clinical assessment     SPEECH PATHOLOGY DIAGNOSIS:    Arked cognitive evaluation, moderate dysarthria.    Speech Pathology intervention is recommended 3-6 times per week for LOS or when goals are met with emphasis on the following:      Conditions Requiring Skilled Therapeutic Intervention for speech, language and/or cognition    Dysarthria   Cognitive linguistic impairment  Decreased safety awareness  Decreased short term memory  Decreased problem solving skills   Decreased thought organization    Specific Speech Therapy Interventions to Include:   Receptive language training   Therapeutic tasks for Cognition  Therapeutic exercises for dysarthria    Specific instructions for next treatment:     To initiate language tasks  To initiate memory tasks  To initiate thought organization tasks  To initiate speech production tasks    SHORT/LONG TERM GOALS  Pt will improve orientation to spatial and temporal surroundings with use of external memory aides.  Pt will improve immediate, short term, recent memory during structured and unstructured tasks with 40% accuracy   Pt will improve problem  noted during conversation    COGNITION     Attention/Orientation  Attention: Easily Distracted  Orientation:  Oriented to Person                    EDUCATION:   The Speech Language Pathologist (SLP) completed education regarding results of evaluation and that intervention is warranted at this time.  Learner: Patient  Education: Reviewed results and recommendations of this evaluation  Evaluation of Education:  Verbalizes understanding    Evaluation Time includes thorough review of current medical information, gathering information on past medical history/social history and prior level of function, completion of standardized testing/informal observation of tasks, assessment of data and education on plan of care and goals.      CPT code:    72705  eval speech sound lang comprehension      The admitting diagnosis and active problem list, as listed below have been reviewed prior to initiation of this evaluation.        ACTIVE PROBLEM LIST:   Patient Active Problem List   Diagnosis    Closed fracture of odontoid process of axis (HCC)    Stroke-like symptom

## 2024-10-01 NOTE — H&P
as needed for Pain    Nick Lyons MD   LORazepam (ATIVAN) 0.5 MG tablet Take 1 tablet by mouth every 4 hours as needed for Anxiety. Max Daily Amount: 3 mg    ProviderNick MD   magnesium hydroxide (MILK OF MAGNESIA) 800 MG/5ML suspension Take 30 mLs by mouth daily as needed for Constipation    ProviderNick MD       Allergies:  Patient has no known allergies.    Social History:      The patient currently lives nursing facility.    TOBACCO:   has no history on file for tobacco use.  ETOH:   has no history on file for alcohol use.      Family History:       Reviewed in detail and negative for DM, CAD, Cancer, CVA. Positive as follows:    No family history on file.    REVIEW OF SYSTEMS:   Pertinent positives as noted in the HPI. All other systems reviewed and negative.    PHYSICAL EXAM:    BP (!) 149/81   Pulse 94   Temp 97.5 °F (36.4 °C) (Oral)   Resp 21   Ht 1.727 m (5' 8\")   Wt 95.3 kg (210 lb)   SpO2 97%   BMI 31.93 kg/m²     General appearance:  No apparent distress, appears stated age and sleepy.  HEENT:  Normal cephalic, atraumatic without obvious deformity. Pupils equal, round, and reactive to light.  Extra ocular muscles intact. Conjunctivae/corneas clear.  Neck: Supple, with full range of motion. No jugular venous distention. Trachea midline.  Respiratory:  Normal respiratory effort. Clear to auscultation, bilaterally without Rales/Wheezes/Rhonchi.  Cardiovascular:  Regular rate and rhythm with normal S1/S2 without murmurs, rubs or gallops.  Abdomen: Soft, non-tender, non-distended with normal bowel sounds.  Musculoskeletal:  No clubbing, cyanosis or edema bilaterally.    Skin: Skin color, texture, turgor normal.  No rashes or lesions.  Neurologic:  Neurovascularly intact without any focal sensory/motor deficits. Cranial nerves: II-XII intact, grossly non-focal.  Psychiatric: Not alert or oriented    EKG:  I have reviewed the EKG with the following interpretation: Sinus rhythm  with PVCs and right bundle branch block also left anterior fascicular block, bifascicular block.    Labs:     Recent Labs     09/30/24  1030 09/30/24 2024   WBC 4.7 5.7   HGB 11.0* 10.7*   HCT 34.3* 32.6*    168     Recent Labs     09/30/24  1745 09/30/24 2024    144   K 3.7 3.5    105   CO2 23 25   BUN 35* 35*   CREATININE 2.8* 2.7*   CALCIUM 9.5 9.5     Recent Labs     09/30/24  1745   AST 27   ALT 15   BILITOT 0.5   ALKPHOS 69     No results for input(s): \"INR\" in the last 72 hours.  No results for input(s): \"CKTOTAL\", \"TROPONINI\" in the last 72 hours.    Urinalysis:    No results found for: \"NITRU\", \"WBCUA\", \"BACTERIA\", \"RBCUA\", \"BLOODU\", \"SPECGRAV\", \"GLUCOSEU\"      ASSESSMENT:  Strokelike symptoms  Type II odontoid  fracture  Fall  Delirium  High-grade stenosis of left vertebral artery  CKD  Dementia  Other past medical history including coronary disease, history of seizures, status post pacemaker    PLAN:  Stroke workup MRI brain.Neurology consult.  Echocardiogram.  Will check urinalysis given his altered mental status.  Appreciate neurosurgery put concerningType II odontoid fracture, continue custom cervical collar.  PT OT.  Speech therapy.  Baby aspirin             Serge Lamar MD    Thank you Andrew Chakraborty MD for the opportunity to be involved in this patient's care. If you have any questions or concerns please feel free to contact me through [employer].

## 2024-10-01 NOTE — PROGRESS NOTES
Went to go evaluate the patient for consultation regarding a cervical spine fracture.  On examination the patient is able to say his name but it is slightly slurred he has a slight right-sided facial droop he will not follow commands in his lower extremities but will withdraw slightly to pain and is moving his right upper extremity difficult access left upper extremity partial amputation and not moving this as freely.  Per nursing staff he has been slightly agitated minimally redirectable and attempting to get out of bed.  Discussed with emergency room physicians that I am concerned that I do not know his baseline and what he was like when he first came into the emergency room and whether we need to do a stroke workup.  They stated they will look into his functionality at generations. Pending his baseline, CODE STATUS, additional workup to decide disposition    Melanie Toro MD

## 2024-10-01 NOTE — PLAN OF CARE
I came to the room to perform Geriatric Assessment, unfortunately the patient remains somnolent and unable to answer question at this time. I will try again later.     Electronically signed by Jr Deleon MD on 10/1/2024 at 8:45 AM

## 2024-10-01 NOTE — ED NOTES
NIH Stroke Scale/Score at time of initial evaluation:  1A: Level of Consciousness 1 - not alert but arousable by minor stimulation to obey, answer or respond   1B: Ask Month and Age 1 - answers one question correctly   1C: Tell Patient To Open and Close Eyes, then Hand  Squeeze 0 - performs both tasks correctly   2: Test Horizontal Extraocular Movements 0 - normal   3: Test Visual Fields 0 - no visual loss   4: Test Facial Palsy 1 - minor paralysis (flattened nasolabial fold, asymmetric on smiling)   5A: Test Left Arm Motor Drift 0 - no drift, limb holds 90 (or 45) degrees for full 10 seconds   5B: Test Right Arm Motor Drift 0 - no drift, limb holds 90 (or 45) degrees for full 10 seconds   6A: Test Left Leg Motor Drift 0 - no drift; leg holds 30 degree position for full 5 seconds   6B: Test Right Leg Motor Drift 0 - no drift; leg holds 30 degree position for full 5 seconds   7: Test Limb Ataxia   (FNF/Heel-Shin) 0 - absent   8: Test Sensation 0 - normal; no sensory loss   9: Test Language/Aphasia 1 - mild to moderate aphasia; some obvious loss of fluency or facility of comprehension without significant limitation on ideas expressed or form of expression.  Reduction of speech and/or comprehension, however, makes conversation about provided materials difficult or impossible.  For example, in conversation about provided materials, examiner can identify picture or naming card content from patient's response.    10: Test Dysarthria 0 - normal   11: Test Extinction/Inattention 0 - no abnormality   Total Score: 4   9/30/24 at 8:00 PM EDT.      Acute CVA Core Measures:      - t-PA Eligibility: IV t-PA was considered and not given due to violations in inclusion criteria including stroke onset was greater than 3 hours prior to presentation           Ant Marti DO  10/02/24 0811

## 2024-10-01 NOTE — PROGRESS NOTES
4 Eyes Skin Assessment     NAME:  Jorge Luis Chowdhury  YOB: 1933  MEDICAL RECORD NUMBER:  [id number]    The patient is being assessed for  Admission    I agree that at least one RN has performed a thorough Head to Toe Skin Assessment on the patient. ALL assessment sites listed below have been assessed.      Areas assessed by both nurses:    Head, Face, Ears, Shoulders, Back, Chest, Arms, Elbows, Hands, Sacrum. Buttock, Coccyx, Ischium, Legs. Feet and Heels, and Under Medical Devices         Does the Patient have a Wound? No noted wound(s)       Joseluis Prevention initiated by RN: No  Wound Care Orders initiated by RN: No    Pressure Injury (Stage 3,4, Unstageable, DTI, NWPT, and Complex wounds) if present, place Wound referral order by RN under : No    New Ostomies, if present place, Ostomy referral order under : No     Nurse 1 eSignature: Electronically signed by Babs Medrano RN on 10/1/24 at 7:01 PM EDT    **SHARE this note so that the co-signing nurse can place an eSignature**    Nurse 2 eSignature: Electronically signed by Dennis Johnson RN on 10/1/24 at 7:07 PM EDT
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
## 2024-10-02 LAB
CHOLEST SERPL-MCNC: 141 MG/DL
ERYTHROCYTE [DISTWIDTH] IN BLOOD BY AUTOMATED COUNT: 13.2 % (ref 11.5–15)
HBA1C MFR BLD: 5.8 % (ref 4–5.6)
HCT VFR BLD AUTO: 32.9 % (ref 37–54)
HDLC SERPL-MCNC: 40 MG/DL
HGB BLD-MCNC: 10.8 G/DL (ref 12.5–16.5)
LDLC SERPL CALC-MCNC: 84 MG/DL
MCH RBC QN AUTO: 33.8 PG (ref 26–35)
MCHC RBC AUTO-ENTMCNC: 32.8 G/DL (ref 32–34.5)
MCV RBC AUTO: 102.8 FL (ref 80–99.9)
PLATELET # BLD AUTO: 165 K/UL (ref 130–450)
PMV BLD AUTO: 11.1 FL (ref 7–12)
RBC # BLD AUTO: 3.2 M/UL (ref 3.8–5.8)
TRIGL SERPL-MCNC: 83 MG/DL
VLDLC SERPL CALC-MCNC: 17 MG/DL
WBC OTHER # BLD: 5.1 K/UL (ref 4.5–11.5)

## 2024-10-02 PROCEDURE — 96372 THER/PROPH/DIAG INJ SC/IM: CPT

## 2024-10-02 PROCEDURE — 6370000000 HC RX 637 (ALT 250 FOR IP)

## 2024-10-02 PROCEDURE — 6360000002 HC RX W HCPCS: Performed by: FAMILY MEDICINE

## 2024-10-02 PROCEDURE — G0378 HOSPITAL OBSERVATION PER HR: HCPCS

## 2024-10-02 PROCEDURE — 97165 OT EVAL LOW COMPLEX 30 MIN: CPT

## 2024-10-02 PROCEDURE — 97535 SELF CARE MNGMENT TRAINING: CPT

## 2024-10-02 PROCEDURE — 6370000000 HC RX 637 (ALT 250 FOR IP): Performed by: FAMILY MEDICINE

## 2024-10-02 PROCEDURE — 85027 COMPLETE CBC AUTOMATED: CPT

## 2024-10-02 PROCEDURE — 83036 HEMOGLOBIN GLYCOSYLATED A1C: CPT

## 2024-10-02 PROCEDURE — 97530 THERAPEUTIC ACTIVITIES: CPT

## 2024-10-02 PROCEDURE — 99222 1ST HOSP IP/OBS MODERATE 55: CPT

## 2024-10-02 PROCEDURE — 80061 LIPID PANEL: CPT

## 2024-10-02 PROCEDURE — 2580000003 HC RX 258

## 2024-10-02 PROCEDURE — 99232 SBSQ HOSP IP/OBS MODERATE 35: CPT

## 2024-10-02 PROCEDURE — 36415 COLL VENOUS BLD VENIPUNCTURE: CPT

## 2024-10-02 PROCEDURE — 99232 SBSQ HOSP IP/OBS MODERATE 35: CPT | Performed by: SURGERY

## 2024-10-02 PROCEDURE — 6360000002 HC RX W HCPCS

## 2024-10-02 PROCEDURE — 92610 EVALUATE SWALLOWING FUNCTION: CPT

## 2024-10-02 PROCEDURE — 97161 PT EVAL LOW COMPLEX 20 MIN: CPT

## 2024-10-02 PROCEDURE — 2580000003 HC RX 258: Performed by: FAMILY MEDICINE

## 2024-10-02 RX ORDER — LEVETIRACETAM 100 MG/ML
500 SOLUTION ORAL 2 TIMES DAILY
Status: DISCONTINUED | OUTPATIENT
Start: 2024-10-02 | End: 2024-10-03

## 2024-10-02 RX ORDER — HALOPERIDOL 5 MG/ML
2 INJECTION INTRAMUSCULAR EVERY 6 HOURS PRN
Status: DISCONTINUED | OUTPATIENT
Start: 2024-10-02 | End: 2024-10-04 | Stop reason: HOSPADM

## 2024-10-02 RX ADMIN — HALOPERIDOL LACTATE 2 MG: 5 INJECTION, SOLUTION INTRAMUSCULAR at 12:34

## 2024-10-02 RX ADMIN — SODIUM CHLORIDE, PRESERVATIVE FREE 10 ML: 5 INJECTION INTRAVENOUS at 09:13

## 2024-10-02 RX ADMIN — ZIPRASIDONE MESYLATE 10 MG: 20 INJECTION, POWDER, LYOPHILIZED, FOR SOLUTION INTRAMUSCULAR at 20:51

## 2024-10-02 RX ADMIN — ENOXAPARIN SODIUM 30 MG: 100 INJECTION SUBCUTANEOUS at 09:13

## 2024-10-02 RX ADMIN — ASPIRIN 300 MG: 300 SUPPOSITORY RECTAL at 09:13

## 2024-10-02 RX ADMIN — DIVALPROEX SODIUM 125 MG: 125 TABLET, DELAYED RELEASE ORAL at 09:15

## 2024-10-02 RX ADMIN — PANTOPRAZOLE SODIUM 40 MG: 40 TABLET, DELAYED RELEASE ORAL at 09:13

## 2024-10-02 RX ADMIN — LEVETIRACETAM 500 MG: 100 SOLUTION ORAL at 09:13

## 2024-10-02 RX ADMIN — TAMSULOSIN HYDROCHLORIDE 0.4 MG: 0.4 CAPSULE ORAL at 09:13

## 2024-10-02 RX ADMIN — DIVALPROEX SODIUM 125 MG: 125 TABLET, DELAYED RELEASE ORAL at 13:59

## 2024-10-02 RX ADMIN — HALOPERIDOL LACTATE 2 MG: 5 INJECTION, SOLUTION INTRAMUSCULAR at 18:15

## 2024-10-02 RX ADMIN — Medication 1000 UNITS: at 09:13

## 2024-10-02 NOTE — PLAN OF CARE
Await brain MRI-- if unable to be done, will repeat CT head and follow results.     Call with questions prior to then.

## 2024-10-02 NOTE — ACP (ADVANCE CARE PLANNING)
Advance Care Planning   Healthcare Decision Maker:    Primary Decision Maker: Demetria Chowdhury - Spouse - [phone]    Click here to complete Healthcare Decision Makers including selection of the Healthcare Decision Maker Relationship (ie \"Primary\").                  Shagufta Avila RN.

## 2024-10-02 NOTE — PROGRESS NOTES
Occupational Therapy    OCCUPATIONAL THERAPY INITIAL EVALUATION    [facility]  [address] [place], [place]        Date:10/2/2024                                                  Patient Name: Jorge Luis Chowdhury    MRN: [id number]    : 1933    Room: [address] [place] [address]          Evaluating OT: Deb Mccollum, LUDIVINAD, OTR/L; [id number]      Referring Provider:   Serge Lamar MD       Specific Provider Orders/Date: OT Eval and Treat 10/01/2024      Diagnosis:    1. Cerebrovascular accident (CVA), unspecified mechanism (HCC)       Patient Active Problem List   Diagnosis    Closed fracture of odontoid process of axis (HCC)    Stroke-like symptom    Fall        Pertinent Medical History: No past medical history on file.     Surgery: none this admission        Recommended Adaptive Equipment: TBD       Precautions:  Fall Risk, pacemaker, confused, +alarms, telesitter, can become aggressive per chart, seizure, dysarthria, C1 fx-custom C collar, spinal, monitor BP, L hand amputation, h/o R rotator cuff tear chronic (per wife)     Assessment of current deficits    [x] Functional mobility  [x]ADLs  [x] Strength               [x]Cognition    [x] Functional transfers   [x] IADLs         [x] Safety Awareness   [x]Endurance    [x] Fine Coordination              [x] Balance      [] Vision/perception   []Sensation     []Gross Motor Coordination  [] ROM  [] Delirium                   [] Motor Control     OT PLAN OF CARE   OT POC based on physician orders, patient diagnosis and results of clinical assessment    Frequency/Duration 1-3 days/wk for 2 weeks PRN   Specific OT Treatment Interventions to include:   * Instruction/training on adapted ADL techniques and AE recommendations to increase functional independence within precautions       * Training on energy conservation strategies, correct breathing pattern and techniques to improve independence/tolerance for self-care

## 2024-10-02 NOTE — PROGRESS NOTES
criteria.   6. 50% stenosis of the proximal left internal carotid artery using NASCET   criteria.   7. No perfusion abnormality.         CT HEAD WO CONTRAST   Final Result   1. No acute intracranial abnormality.   2. Minimally displaced fracture through the C1 ring on the right.  Please   refer to the CT C-spine report as this is incompletely visualized.         CT CERVICAL SPINE WO CONTRAST   Final Result   Type 3 odontoid fracture.         XR CHEST PORTABLE    (Results Pending)   XR HIP BILATERAL W AP PELVIS (2 VIEWS)    (Results Pending)   MRI brain without contrast    (Results Pending)       PHYSICAL EXAM:     Central Nervous System  Loss of consciousness:  unknown    GCS:    Eye:  3 - Opens eyes to loud noise or command  Motor:  6 - follows command  Verbal:  3 - Talks, but nonsensical    Neuromuscular blockade: No  Pupil size:  Left 2 mm    Right 6 mm  Pupil reaction: left pupil reactive, Right pupil not reactive    Wiggles fingers: Left can't assess below elbow amputation Right Yes  Wiggles toes: Left Yes   Right Yes    Hand grasp:   Left  can't assess below elbow amputation    Right  Present  Plantar flexion: Left  Absent      Right   Absent    PHYSICAL EXAM  General: resting in bed, talking nonsensical, confused, C-collar in place  HEENT: Trachea midline, no masses, Pupils no equal  Chest: Respiratory effort was normal with no retractions or use of accessory muscles. Tolerating room air  Cardiovascular: Extremities warm, well perfused,   Abdomen:  Soft and non distended.  No tenderness, guarding, rebound, or rigidity   Extremities:  below elbow amputation left    Spine:   Spine Tenderness ROM   Cervical C-collar in place Normal   Thoracic 0 /10 Normal   Lumbar 0 /10 Normal     Musculoskeletal:    Joint Tenderness Swelling ROM   Right shoulder Absent absent normal   Left shoulder absent absent normal   Right elbow absent absent normal   Left elbow absent absent normal   Right wrist absent absent normal    Left wrist N/A N/A N/A   Right hand grasp Absent absent normal   Left hand grasp N/A N/A N/A   Right hip absent absent normal   Left hip absent absent normal   Right knee Absent absent normal   Left knee absent absent normal   Right ankle absent absent normal   Left ankle absent absent normal   Right foot Absent absent normal   Left foot absent absent normal       CONSULTS: Neurosurgery, Internal Medicine        INJURIES:      Principal Problem:    Stroke-like symptom  Active Problems:    Closed fracture of odontoid process of axis (HCC)    Fall  Resolved Problems:    * No resolved hospital problems. *        Assessment/Plan:       Neuro:  GCS 12, C1 Fx, Type III odontoid Fx. Hx dementia, seizure  CV: HR near normal limits, no acute issues, Hx CAD with pacemaker  Pulm: tolerating room air    GI: No acute issues  Renal: no acute issues   ID: afebrile, no acute issues     Endocrine: no acute issues,   MSK: below elbow amputation LUE   Heme: no acute issues      Plan:  - Neurosurgery consulted: Recommended custom cervical collar.  - Maintain C-collar   - Continue Keppra   - MRI brain pending  - neuro check Q4H  - PT/OT today   - NPO until cleared by SLP .   - bowel regimen   - Lovenox for DVT prophylaxis.   - Rest per primary       Electronically signed by Kurt Machado DO PGY-1 on 10/2/24 at 9:31 AM T         [facility]   ATTENDING PHYSICIAN PROGRESS NOTE      I have personally examined the patient, personally reviewed the record, and personally discussed the case with the resident. I have personally reviewed all relevant labs and imaging data. I am actively managing this patient's medications.  Please refer to the resident's note. I agree with the assessment and plan with the following corrections/additions. The following summarizes my clinical findings and independent assessment.      CC: fall     Pt confused.     Asleep but arousable  Follows commands  Confused  Hrt:  regular  Lungs:

## 2024-10-02 NOTE — PLAN OF CARE
It is difficult to perform Geriatric Assessment for the patient given mental status. He is unable to answer any question appropriately.     Electronically signed by Jr Deleon MD on 10/2/2024 at 8:18 AM

## 2024-10-02 NOTE — PROGRESS NOTES
SPEECH/LANGUAGE PATHOLOGY  CLINICAL ASSESSMENT OF SWALLOWING FUNCTION   and PLAN OF CARE    PATIENT NAME:  Jorge Luis Chowdhury  (male)     MRN:  [id number]    :  1933  (89 y.o.)  STATUS:  Inpatient: Room 8515/8515-A    TODAY'S DATE:  10/2/2024  REFERRING PROVIDER:     SPECIFIC PROVIDER ORDER: SLP swallowing-dysphagia evaluation and treatment Date of order:  10-2-24  REASON FOR REFERRAL: dysphagia   EVALUATING THERAPIST: DIPESH Yadav                 RESULTS:    DYSPHAGIA DIAGNOSIS:   Clinical indicators of moderate oropharyngeal phase dysphagia       DIET RECOMMENDATIONS:  Pureed consistency solids (IDDSI level 4) with  honey consistency (moderately thick - IDDSI level 3)  liquids     FEEDING RECOMMENDATIONS:     Assistance level:  Full assistance is needed during all oral intake      Compensatory strategies recommended: Fully alert for all PO      Discussed recommendations with:   floor nurse (patient nurse and charge nurse unavailable)    SPEECH THERAPY  PLAN OF CARE   The dysphagia POC is established based on physician order, dysphagia diagnosis and results of clinical assessment     Skilled SLP intervention for dysphagia management on acute care up to 5 x per week until goals met, pt plateaus in function and/or discharged from hospital    Conditions Requiring Skilled Therapeutic Intervention for dysphagia:    Patient is performing below functional baseline d/t  current acute condition, respiratory compromise, multiple medications, and/or increased dependency upon caregivers.    Specific dysphagia interventions to include:     ongoing mealtime assessment to provide diet modification and compensatory strategy implementation to minimize risk of aspiration associated with PO intake  PO trials of upgraded diet textures with SLP only to determine the least restrictive PO diet     Specific instructions for next treatment:  ongoing PO analysis to upgrade diet and evaluate tolerance of current PO

## 2024-10-02 NOTE — PROGRESS NOTES
Pt has a pacemaker (Peña Model: P/G-BQ6930, serial # [id number], RV-lead Model: 2088TC/65, serial # [id number]) placed by Dr. Brandon Tony on 8/30/2024 (phone #:[phone])

## 2024-10-02 NOTE — PROGRESS NOTES
unselect if not a suspected or confirmed emergency medical condition->Emergency Medical Condition (MA) What reading provider will be dictating this exam?->CRC FINDINGS: BRAIN/VENTRICLES: There is no acute intracranial hemorrhage, mass effect or midline shift. No abnormal extra-axial fluid collection.  The gray-white differentiation is maintained without evidence of an acute infarct. There is prominence of the ventricles and sulci due to global parenchymal volume loss. There are nonspecific areas of hypoattenuation within the periventricular and subcortical white matter, which likely represent chronic microvascular ischemic change. ORBITS: The visualized portion of the orbits demonstrate no acute abnormality. SINUSES: The visualized paranasal sinuses and mastoid air cells demonstrate no acute abnormality. SOFT TISSUES/SKULL: No acute abnormality of the visualized skull or soft tissues.  There is a minimally displaced fracture through the C1 ring on the right.  Please refer to the CT cervical spine report as this is incompletely visualized.     1. No acute intracranial abnormality. 2. Minimally displaced fracture through the C1 ring on the right.  Please refer to the CT C-spine report as this is incompletely visualized.     Assessment//Plan           Hospital Problems             Last Modified POA    * (Principal) Stroke-like symptom 9/30/2024 Yes    Closed fracture of odontoid process of axis (HCC) 9/30/2024 Yes    Fall 10/1/2024 Yes     Assessment & Plan    ASSESSMENT:  Strokelike symptoms  Type II odontoid  fracture  Fall  Delirium  High-grade stenosis of left vertebral artery  CKD  Dementia  Other past medical history including coronary disease, history of seizures, status post pacemaker     PLAN:  Stroke workup:  MRI brain still pending because patient has a pacemaker placed less than 6 weeks ago  Started with a pacemaker company to see if it is MRI compatible  Echocardiogram which showed positive for bubble
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
## 2024-10-02 NOTE — PROGRESS NOTES
GENERAL SURGERY  DAILY PROGRESS NOTE    Patient's Name/Date of Birth: Jorge Luis Chowdhury / 1933    Date: 2024     Chief Complaint   Patient presents with    Fall     Unwitnessed fall at generations- baseline oriented to person only- pt complains of neck stiffness        Subjective:  Patient was agitated overnight and was given Ativan around 9 PM. He remains somnolent this morning and is incomprehensible. He complained of sore throat but was unable to answer any questions.  Per wife, patient had a right eye injury at work in the past and has been fixed since then.       Objective:  Last 24Hrs  Temp  Av.7 °F (36.5 °C)  Min: 97.2 °F (36.2 °C)  Max: 98.6 °F (37 °C)  Resp  Av.9  Min: 16  Max: 24  Pulse  Av.1  Min: 67  Max: 101  Systolic (24hrs), Av , Min:132 , Max:165     Diastolic (24hrs), Av, Min:67, Max:92    SpO2  Av.5 %  Min: 94 %  Max: 98 %    I/O last 3 completed shifts:  In: 10 [I.V.:10]  Out: -       General: NAD, somnolent.   Head: R pupil fixed, left pupil 2mm and reactive to light.   Neck: C-collar in place.   Cardiovascular: Warm throughout, no edema.   Respiratory: no respiratory distress, equal chest rise  Abdomen: soft, nontender, non-distended      CBC  Recent Labs     24  1030 09/30/24  2024 10/02/24  0502   WBC 4.7 5.7 5.1   RBC 3.31* 3.19* 3.20*   HGB 11.0* 10.7* 10.8*   HCT 34.3* 32.6* 32.9*   .6* 102.2* 102.8*   MCH 33.2 33.5 33.8   MCHC 32.1 32.8 32.8   RDW 13.2 13.0 13.2    168 165   MPV 10.9 10.8 11.1       CMP  Recent Labs     24  1745 24    144   K 3.7 3.5    105   CO2 23 25   BUN 35* 35*   CREATININE 2.8* 2.7*   GLUCOSE 105* 105*   CALCIUM 9.5 9.5   BILITOT 0.5  --    ALKPHOS 69  --    AST 27  --    ALT 15  --          Assessment/Plan:    Patient Active Problem List   Diagnosis    Closed fracture of odontoid process of axis (HCC)    Stroke-like symptom    Fall       89 y.o. male  s/p unwitnessed fall with

## 2024-10-02 NOTE — CONSULTS
NEUROLOGY CONSULT NOTE      Requesting Physician:  Serge Lamar MD    Reason for Consult:  Evaluate for strokelike symptoms.    History of Present Illness:  Jorge Luis Chowdhury is a 89 y.o. male  with h/o  Dementia, Seizures, CAD, pacemaker placement, Explosive Disorder,  and recent diagnosis of UTI on 9/26   who was admitted to [facility] on 9/30/2024 with presentation of unwitnessed fall.  [facility] at time of incident with limited information available from this facility as to his overall status.  He was reportedly significantly altered at time of presentation with confusion and slurred speech.  There was concern for possible stroke at time of presentation prompting stroke workup.  NIHSS was recorded at 4.  CT scan of the head did not show any acute intracranial abnormality.  There was evidence of minimally displaced fracture through C1 ring on the right-type III undone toyed fracture.  CTA of head and neck was notable for nonsignificant old multi focal areas of stenosis.  No arterial occlusion was appreciated.  CT perfusion study did not show any perfusion defects.  Patient severely admitted for further evaluation with neurosurgery consulted for patient's odontoid fracture.  Patient was recommended for cervical collar with no indication for surgical intervention per neurosurgery.  There is no report of any seizure type activity.  Patient according to staff has been responding appropriately.  He has a sitter at the bedside who indicated that there has been no significant behavioral issues so far.  He days receiving as needed Ativan along with Remeron 7.5 mg nightly to help with sleep.  Patient is also on Keppra and Depakote for seizure control.  No report of any adverse reaction to his medications.      Past Medical History:    No past medical history on file.    No past surgical history on file.    Social History:  Social History     Tobacco Use  g/dL    RDW 13.2 11.5 - 15.0 %    Platelets 174 130 - 450 k/uL    MPV 10.9 7.0 - 12.0 fL    Neutrophils % 65 43.0 - 80.0 %    Lymphocytes % 25 20.0 - 42.0 %    Monocytes % 8 2.0 - 12.0 %    Eosinophils % 2 0 - 6 %    Basophils % 1 0.0 - 2.0 %    Immature Granulocytes % 0 0.0 - 5.0 %    Neutrophils Absolute 3.04 1.80 - 7.30 k/uL    Lymphocytes Absolute 1.17 (L) 1.50 - 4.00 k/uL    Monocytes Absolute 0.35 0.10 - 0.95 k/uL    Eosinophils Absolute 0.09 0.05 - 0.50 k/uL    Basophils Absolute 0.03 0.00 - 0.20 k/uL    Immature Granulocytes Absolute <0.03 0.00 - 0.58 k/uL   Treponema pallidum, confirmation    Collection Time: 09/30/24 10:30 AM   Result Value Ref Range    T Pallidum Antibodies (TP-PA) NONREACTIVE NONREACTIVE   TSH    Collection Time: 09/30/24 10:30 AM   Result Value Ref Range    TSH 2.17 0.27 - 4.20 uIU/mL   Vitamin B12 & Folate    Collection Time: 09/30/24 10:30 AM   Result Value Ref Range    Vitamin B-12 357 211 - 946 pg/mL    Folate 7.9 4.8 - 24.2 ng/mL   Comprehensive Metabolic Panel    Collection Time: 09/30/24  5:45 PM   Result Value Ref Range    Sodium 144 132 - 146 mmol/L    Potassium 3.7 3.5 - 5.0 mmol/L    Chloride 106 98 - 107 mmol/L    CO2 23 22 - 29 mmol/L    Anion Gap 15 7 - 16 mmol/L    Glucose 105 (H) 74 - 99 mg/dL    BUN 35 (H) 6 - 23 mg/dL    Creatinine 2.8 (H) 0.70 - 1.20 mg/dL    Est, Glom Filt Rate 21 (L) >60 mL/min/1.73m2    Calcium 9.5 8.6 - 10.2 mg/dL    Total Protein 7.5 6.4 - 8.3 g/dL    Albumin 3.4 (L) 3.5 - 5.2 g/dL    Total Bilirubin 0.5 0.0 - 1.2 mg/dL    Alkaline Phosphatase 69 40 - 129 U/L    ALT 15 0 - 40 U/L    AST 27 0 - 39 U/L   CBC    Collection Time: 09/30/24  8:24 PM   Result Value Ref Range    WBC 5.7 4.5 - 11.5 k/uL    RBC 3.19 (L) 3.80 - 5.80 m/uL    Hemoglobin 10.7 (L) 12.5 - 16.5 g/dL    Hematocrit 32.6 (L) 37.0 - 54.0 %    .2 (H) 80.0 - 99.9 fL    MCH 33.5 26.0 - 35.0 pg    MCHC 32.8 32.0 - 34.5 g/dL    RDW 13.0 11.5 - 15.0 %    Platelets 168 130 - 450 k/uL

## 2024-10-02 NOTE — PROGRESS NOTES
Call placed to [facility] to find out if theres a waiting period for MRI after getting pacemaker-they stated there is no waiting period

## 2024-10-02 NOTE — CONSULTS
Palliative Care Department  [phone]  Palliative Care Initial Consult  Provider Rose Lee, LORENA - CNP      PATIENT: Jorge Luis Chowdhury  : 1933  MRN: [id number]  ADMISSION DATE: 2024  1:05 PM  Referring Provider:  Som Gillespie MD    Palliative Medicine was consulted on hospital day 0 for assistance with Goals of care    HPI:     Clinical Summary:Jorge Luis Chowdhury is a 89 y.o. y/o male with a history of dementia, CKD, hypertension, BPH, hard of hearing, PE, left upper arm amputation, who presented to [facility] on 2024 from [facility] with unwitnessed fall.  CT of neck showed concern for type III odontoid fracture.  Neurosurgery recommended cervical collar.  CT head and neck were obtained which showed 50% stenosis of the proximal end of the M2 branch of the left MCA, high-grade stenosis of the origin of the dominant left vertebral artery.  20% stenosis of the proximal right internal carotid and 50% stenosis of the proximal left internal carotid.  Evaluated by speech, on puréed consistent solids with honey consistent liquids.    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  Capacity is time limited and situation/question specific  During encounter Vicky was surrogate medical decision-maker  Outcome of goals of care meeting:  Continue with current medical treatment  Family are not considering invasive procedure or testing  Family will not consider any life-sustaining measures social ventilation support or artificial nutrition via PEG tube  Introduced comfort measures and hospice, daughter is going to discuss this  sustain a cardiac or pulmonary arrest, or be kept alive artificially via tube feeding.     -CODE STATUS discussed, DNR CCA versus limited code discussed, established limited no to all options.  Vicky tells me, hospice has been brought up however they have not decided to fully pursue that option.  Offer hospice consult for information only, Vicky would like to follow-up about hospice tomorrow, she would like to discuss this with her mother in the morning, and see if she is ready to have hospice come in and provide information.  Support provided.  Will continue to follow    Prognosis: Poor    OBJECTIVE:     /73   Pulse 94   Temp 97.7 °F (36.5 °C) (Temporal)   Resp 18   Ht 1.727 m (5' 8\")   Wt 95.3 kg (210 lb)   SpO2 96%   BMI 31.93 kg/m²     Physical Examination:  Gen: elderly, thin, lethargic, ill-appearing  Lungs: respirations easy   Heart: regular rate   Abdomen:soft, non-tender  Extremities:  edema   Skin: warm, dry without rashes, lesions, bruising  Neuro: Lethargic    Objective data reviewed: labs, images, records, medication use, vitals, and chart    Time/Communication  Greater than 50% of time spent, total 55 minutes in counseling and coordination of care at the bedside regarding goals of care.    Thank you for allowing Palliative Medicine to participate in the care of Jorge Luis Chowdhury.    Note: This report was completed using computerZAOZAO voiced recognition software.  Every effort has been made to ensure accuracy; however, inadvertent computerized transcription errors may be present.

## 2024-10-02 NOTE — PROGRESS NOTES
upright posture, joint and skin integrity, and interaction with environment.     Skilled monitoring of vitals throughout session.    Pt's/ family goals   1. To get better    Prognosis is fair for reaching above PT goals.    Patient and or family understand(s) diagnosis, prognosis, and plan of care.  yes    PHYSICAL THERAPY PLAN OF CARE:    PT POC is established based on physician order and patient diagnosis     Referring provider/PT Order:    10/01/24 0115  PT evaluation and treat  Start:  10/01/24 0115,   End:  10/01/24 0115,   ONE TIME,   Standing Count:  1 Occurrences,   R        Order went unreviewed at transfer on Tue Oct 1, 2024  3:25 AM    Serge Lamar MD     Diagnosis:  Stroke-like symptom [R29.90]  Cerebrovascular accident (CVA), unspecified mechanism (HCC) [I63.9]  Specific instructions for next treatment:  Maximize safe and independent functional mobility     Current Treatment Recommendations:     [x] Strengthening to improve independence with functional mobility   [] ROM to improve independence with functional mobility   [x] Balance Training to improve static/dynamic balance and to reduce fall risk  [x] Endurance Training to improve activity tolerance during functional mobility   [x] Transfer Training to improve safety and independence with all functional transfers   [x] Gait Training to improve gait mechanics, endurance and assess need for appropriate assistive device  [] Stair Training in preparation for safe discharge home and/or into the community   [x] Positioning to prevent skin breakdown and contractures  [x] Safety and Education Training   [x] Patient/Caregiver Education   [] HEP  [x] Other     PT long term treatment goals are located in above grid    Frequency of treatments: 2-5x/week x 1-2 weeks.    Time in  0850  Time out  0920    Total Treatment Time  15 minutes     Evaluation Time includes thorough review of current medical information, gathering information on past medical  history/social history and prior level of function, completion of standardized testing/informal observation of tasks, assessment of data and education on plan of care and goals.    CPT codes:  [x] Low Complexity PT evaluation 09351  [] Moderate Complexity PT evaluation 28249  [] High Complexity PT evaluation 21554  [] PT Re-evaluation 63298  [] Gait training 86983 0 minutes  [] Manual therapy 36356 0 minutes  [x] Therapeutic activities 79514 15 minutes  [] Therapeutic exercises 61068 0 minutes  [] Neuromuscular reeducation 77204 0 minutes     Talia Yarbrough PT, DPT  [id number]

## 2024-10-02 NOTE — CARE COORDINATION
Chart reviewed and case reviewed in IDR.  Patient admitted from [facility] after a fall.  Patient found to have a type 3 odontoid fracture and Custom collar ordered per neurosurgery.  MRI of the brain ordered and outpatient EEG per Neurology.  Speech therapy to see, patient as her failed bedside swallow.  Nursing checked with [facility] and patient is able to have MRI with recently placed pacer.  [facility] worked with the patient today, [facility]: PT 9/24 and OT 11/24.  Spoke with Kari, liaison with [facility] where patient was, prior to transition to [facility] and reviewed case.  Information from patient's PCP , Dr Sergio Joseph received via fax and placed in the light chart, including home medication list.  Clinical faxed to Kari at [phone].  Possible return to [facility] as patient is now a Max assist x2 for transfers and mobility.  Will continue to follow for further transition of care planning needs.       Shagufta Avila RN.  P:  [phone]

## 2024-10-03 PROBLEM — Z51.5 PALLIATIVE CARE ENCOUNTER: Status: ACTIVE | Noted: 2024-10-03

## 2024-10-03 PROBLEM — Z71.89 GOALS OF CARE, COUNSELING/DISCUSSION: Status: ACTIVE | Noted: 2024-10-03

## 2024-10-03 PROBLEM — I63.9 ACUTE ISCHEMIC STROKE (HCC): Status: ACTIVE | Noted: 2024-10-03

## 2024-10-03 PROCEDURE — 6360000002 HC RX W HCPCS: Performed by: CLINICAL NURSE SPECIALIST

## 2024-10-03 PROCEDURE — 2060000000 HC ICU INTERMEDIATE R&B

## 2024-10-03 PROCEDURE — 6360000002 HC RX W HCPCS: Performed by: FAMILY MEDICINE

## 2024-10-03 PROCEDURE — 2580000003 HC RX 258: Performed by: FAMILY MEDICINE

## 2024-10-03 PROCEDURE — 6360000002 HC RX W HCPCS: Performed by: INTERNAL MEDICINE

## 2024-10-03 PROCEDURE — 6370000000 HC RX 637 (ALT 250 FOR IP): Performed by: FAMILY MEDICINE

## 2024-10-03 PROCEDURE — 96372 THER/PROPH/DIAG INJ SC/IM: CPT

## 2024-10-03 PROCEDURE — 99232 SBSQ HOSP IP/OBS MODERATE 35: CPT | Performed by: INTERNAL MEDICINE

## 2024-10-03 PROCEDURE — 96374 THER/PROPH/DIAG INJ IV PUSH: CPT

## 2024-10-03 PROCEDURE — 99232 SBSQ HOSP IP/OBS MODERATE 35: CPT | Performed by: CLINICAL NURSE SPECIALIST

## 2024-10-03 RX ORDER — GLYCOPYRROLATE 0.2 MG/ML
0.4 INJECTION INTRAMUSCULAR; INTRAVENOUS 4 TIMES DAILY
Status: DISCONTINUED | OUTPATIENT
Start: 2024-10-03 | End: 2024-10-03

## 2024-10-03 RX ORDER — LEVETIRACETAM 500 MG/5ML
500 INJECTION, SOLUTION, CONCENTRATE INTRAVENOUS EVERY 12 HOURS
Status: DISCONTINUED | OUTPATIENT
Start: 2024-10-03 | End: 2024-10-04 | Stop reason: HOSPADM

## 2024-10-03 RX ORDER — MORPHINE SULFATE 2 MG/ML
1 INJECTION, SOLUTION INTRAMUSCULAR; INTRAVENOUS EVERY 4 HOURS PRN
Status: DISCONTINUED | OUTPATIENT
Start: 2024-10-03 | End: 2024-10-04 | Stop reason: HOSPADM

## 2024-10-03 RX ORDER — LORAZEPAM 2 MG/ML
0.5 INJECTION INTRAMUSCULAR EVERY 6 HOURS PRN
Status: DISCONTINUED | OUTPATIENT
Start: 2024-10-03 | End: 2024-10-04 | Stop reason: HOSPADM

## 2024-10-03 RX ORDER — GLYCOPYRROLATE 0.2 MG/ML
0.4 INJECTION INTRAMUSCULAR; INTRAVENOUS EVERY 6 HOURS PRN
Status: DISCONTINUED | OUTPATIENT
Start: 2024-10-03 | End: 2024-10-04 | Stop reason: HOSPADM

## 2024-10-03 RX ADMIN — ENOXAPARIN SODIUM 30 MG: 100 INJECTION SUBCUTANEOUS at 10:27

## 2024-10-03 RX ADMIN — ASPIRIN 300 MG: 300 SUPPOSITORY RECTAL at 10:27

## 2024-10-03 RX ADMIN — LEVETIRACETAM 500 MG: 100 INJECTION INTRAVENOUS at 10:27

## 2024-10-03 RX ADMIN — LEVETIRACETAM 500 MG: 100 INJECTION INTRAVENOUS at 22:51

## 2024-10-03 RX ADMIN — LORAZEPAM 0.5 MG: 2 INJECTION INTRAMUSCULAR; INTRAVENOUS at 17:20

## 2024-10-03 RX ADMIN — SODIUM CHLORIDE, PRESERVATIVE FREE 10 ML: 5 INJECTION INTRAVENOUS at 22:56

## 2024-10-03 NOTE — PROGRESS NOTES
[facility] Hospitalist Progress Note    SYNOPSIS: Patient admitted on 2024 for Stroke-like symptom    89 y.o. male who presented to [facility] with unwitnessed fall from [facility]. He was found to be slurring his words. Given this stroke workup was initiated. CT of the neck which showed concern for type III odontoid fracture. Neurosurgery recommended cervical collar. CT head and neck were obtained which showed 50% stenosis of the proximal end of the M2 branch of the left MCA, CTA of the neck showed high-grade stenosis of the origin of the dominant left vertebral artery. 20% stenosis of the proximal right internal carotid and 50% stenosis of the proximal left internal carotid. MRI brain still pending because patient has a pacemaker placed less than 6 weeks ago. Neurosurgery consulted. Palliative care consulted and patient and family electing for hospice at this time.     SUBJECTIVE:  Stable overnight. No other overnight issues reported.   Patient seen and examined  Records reviewed.   Patient lethargic on exam       Temp (24hrs), Av.2 °F (36.8 °C), Min:97.7 °F (36.5 °C), Max:98.6 °F (37 °C)    DIET: ADULT DIET; Dysphagia - Pureed; Moderately Thick (Honey)  CODE: Limited    Intake/Output Summary (Last 24 hours) at 10/3/2024 1240  Last data filed at 10/2/2024 2000  Gross per 24 hour   Intake 200 ml   Output 750 ml   Net -550 ml       Review of Systems  Unable to obtain: mental status       OBJECTIVE:    BP (!) 117/57   Pulse 98   Temp 98.6 °F (37 °C) (Temporal)   Resp 16   Ht 1.727 m (5' 8\")   Wt 95.3 kg (210 lb)   SpO2 97%   BMI 31.93 kg/m²     General appearance:  lethargic, difficult to arouse   HEENT:  Conjunctivae/corneas clear.   Neck: Supple. No jugular venous distention.   Respiratory: symmetrical; clear to auscultation bilaterally; no wheezes; no rhonchi; no rales  Cardiovascular: rhythm regular; rate controlled; no murmurs  Abdomen: Soft,

## 2024-10-03 NOTE — PROGRESS NOTES
Department of Neurosurgery  Progress Note    CHIEF COMPLAINT: seen for cervical fracture    SUBJECTIVE:  Resting in bed, C-Collar on    REVIEW OF SYSTEMS :  Constitutional: Negative for chills and fever.    Neurological: Negative for dizziness, tremors and speech change.     OBJECTIVE:   VITALS:  BP (!) 160/74   Pulse 95   Temp 98.1 °F (36.7 °C) (Axillary)   Resp 20   Ht 1.727 m (5' 8\")   Wt 95.3 kg (210 lb)   SpO2 94%   BMI 31.93 kg/m²     PHYSICAL:  Alert, oriented  Appears stated age  PERRL  EOMI  Strength full  Sensation intact to light touch    DATA:  CBC:   Lab Results   Component Value Date/Time    WBC 5.1 10/02/2024 05:02 AM    RBC 3.20 10/02/2024 05:02 AM    HGB 10.8 10/02/2024 05:02 AM    HCT 32.9 10/02/2024 05:02 AM    .8 10/02/2024 05:02 AM    MCH 33.8 10/02/2024 05:02 AM    MCHC 32.8 10/02/2024 05:02 AM    RDW 13.2 10/02/2024 05:02 AM     10/02/2024 05:02 AM    MPV 11.1 10/02/2024 05:02 AM     BMP:    Lab Results   Component Value Date/Time     09/30/2024 08:24 PM    K 3.5 09/30/2024 08:24 PM     09/30/2024 08:24 PM    CO2 25 09/30/2024 08:24 PM    BUN 35 09/30/2024 08:24 PM    CREATININE 2.7 09/30/2024 08:24 PM    CALCIUM 9.5 09/30/2024 08:24 PM    LABGLOM 22 09/30/2024 08:24 PM    GLUCOSE 105 09/30/2024 08:24 PM     PT/INR:  No results found for: \"PROTIME\", \"INR\"  PTT:  No results found for: \"APTT\"[APTT}    Current Inpatient Medications  Current Facility-Administered Medications: levETIRAcetam (KEPPRA) 100 MG/ML oral solution 500 mg, 500 mg, Oral, BID  haloperidol lactate (HALDOL) injection 2 mg, 2 mg, IntraMUSCular, Q6H PRN  sodium chloride flush 0.9 % injection 5-40 mL, 5-40 mL, IntraVENous, 2 times per day  sodium chloride flush 0.9 % injection 5-40 mL, 5-40 mL, IntraVENous, PRN  0.9 % sodium chloride infusion, , IntraVENous, PRN  ondansetron (ZOFRAN-ODT) disintegrating tablet 4 mg, 4 mg, Oral, Q8H PRN **OR** ondansetron (ZOFRAN) injection 4 mg, 4 mg, IntraVENous,  Q6H PRN  polyethylene glycol (GLYCOLAX) packet 17 g, 17 g, Oral, Daily PRN  enoxaparin Sodium (LOVENOX) injection 30 mg, 30 mg, SubCUTAneous, Daily  atorvastatin (LIPITOR) tablet 80 mg, 80 mg, Oral, Nightly  aspirin chewable tablet 81 mg, 81 mg, Oral, Daily **OR** aspirin suppository 300 mg, 300 mg, Rectal, Daily  divalproex (DEPAKOTE) DR tablet 125 mg, 125 mg, Oral, TID  magnesium hydroxide (MILK OF MAGNESIA) 400 MG/5ML suspension 30 mL, 30 mL, Oral, Daily PRN  mirtazapine (REMERON) tablet 7.5 mg, 7.5 mg, Oral, Nightly  pantoprazole (PROTONIX) tablet 40 mg, 40 mg, Oral, Daily  tamsulosin (FLOMAX) capsule 0.4 mg, 0.4 mg, Oral, Daily  Vitamin D (CHOLECALCIFEROL) tablet 1,000 Units, 1,000 Units, Oral, Daily    ASSESSMENT:   The patient is a 89-year-old gentleman with type 2 odontoid fracture.     PLAN:  Pain control  PT/OT with C-Collar  C-Collar x 3 months  Follow up in clinic in 4 weeks with XR       Electronically signed by BROOKS Lamar on 10/3/2024 at 9:22 AM

## 2024-10-03 NOTE — PROGRESS NOTES
to facility with hospice services closest to home that wife can visit in [facility]    Code status Limited no CPR, no ventilation, no defibrillation, no meds  Advanced Directives: no POA or living will in [facility]  Surrogate/Legal NOK:  Demetria Chowdhury (Spouse/POA) [phone]  Vicky Akins (Daughter/Janet POA) [phone]    Spiritual assessment: no spiritual distress identified  Bereavement and grief: to be determined  Referrals to: none today      SUBJECTIVE:     Details of Conversation:    10/3/24 Chart reviewed. Pt seen. Cervical collar in place.  He is laying in bed; restless; pulling at his clothes; unable to redirected at times. Telesitter in place. Met with wife Demetria and Vicky at bedside. State they reviewed the patient's wishes and discussed with other family members. They are in agreement to consult hospice and just provide him end of life quality and comfort. Demetria and Vicky are asking for a facility closest to where wife Demetria lives in the [facility]. Hospice consult placed. Spoke with  to offer choices- to speak with wife and daughter at bedside. Comfort meds orders; IV ativan; morphine IV and robinul prn if needed.   Goal is to provide comfort and off hospice services.   Emotional support offered.     Prognosis: Poor    OBJECTIVE:     BP (!) 160/74   Pulse 95   Temp 98.1 °F (36.7 °C) (Axillary)   Resp 20   Ht 1.727 m (5' 8\")   Wt 95.3 kg (210 lb)   SpO2 94%   BMI 31.93 kg/m²     Physical Examination:  Gen: elderly, thin, lethargic, ill-appearing; restless; C collar in place  Lungs: respirations easy; moist  Heart: regular rate   Abdomen:soft, non-tender  Extremities:  edema   Skin: warm, dry without rashes, lesions, bruising  Neuro: Lethargic; agitated; unable to redirect    Objective data reviewed: labs, images, records, medication use, vitals, and chart    Time/Communication  Greater than 50% of time spent, total 35 minutes in counseling and coordination of care at the bedside  regarding goals of care.    Thank you for allowing Palliative Medicine to participate in the care of Jorge Luis Chowdhury.

## 2024-10-03 NOTE — PROGRESS NOTES
10/3 Rn does not know if patient will hold still for MRI-     RN will be faxing cardioogy form to Dr. Tony office today if they need MRI attempted, (form faxed to unit [phone] 8am 10/3) Dr will need to fill out and sign the cardiology form before MRI can be scheduled with [facility]

## 2024-10-03 NOTE — PLAN OF CARE
Problem: Safety - Medical Restraint  Goal: Remains free of injury from restraints (Restraint for Interference with Medical Device)  Description: INTERVENTIONS:  1. Determine that other, less restrictive measures have been tried or would not be effective before applying the restraint  2. Evaluate the patient's condition at the time of restraint application  3. Inform patient/family regarding the reason for restraint  4. Q2H: Monitor safety, psychosocial status, comfort, nutrition and hydration  Outcome: Progressing     Problem: Discharge Planning  Goal: Discharge to home or other facility with appropriate resources  Outcome: Progressing     Problem: Pain  Goal: Verbalizes/displays adequate comfort level or baseline comfort level  Outcome: Progressing     Problem: Confusion  Goal: Confusion, delirium, dementia, or psychosis is improved or at baseline  Description: INTERVENTIONS:  1. Assess for possible contributors to thought disturbance, including medications, impaired vision or hearing, underlying metabolic abnormalities, dehydration, psychiatric diagnoses, and notify attending LIP  2. [place] high risk fall precautions, as indicated  3. Provide frequent short contacts to provide reality reorientation, refocusing and direction  4. Decrease environmental stimuli, including noise as appropriate  5. Monitor and intervene to maintain adequate nutrition, hydration, elimination, sleep and activity  6. If unable to ensure safety without constant attention obtain sitter and review sitter guidelines with assigned personnel  7. Initiate Psychosocial CNS and Spiritual Care consult, as indicated  Outcome: Progressing     Problem: Safety - Adult  Goal: Free from fall injury  Outcome: Progressing     Problem: ABCDS Injury Assessment  Goal: Absence of physical injury  Outcome: Progressing     Problem: Skin/Tissue Integrity  Goal: Absence of new skin breakdown  Description: 1.  Monitor for areas of redness and/or skin

## 2024-10-03 NOTE — PROGRESS NOTES
Physician Progress Note      PATIENT:               ELIOT BURGESS  CSN #:                  [id number]  :                       1933  ADMIT DATE:       2024 1:05 PM  DISCH DATE:  RESPONDING  PROVIDER #:        Som Gillespie MD        QUERY TEXT:    Stage of Chronic Kidney Disease: Please provide further specificity, if known.    Clinical indicators include: bun, creatinine, ckd  Options provided:  -- Chronic kidney disease stage 1  -- Chronic kidney disease stage 2  -- Chronic kidney disease stage 3  -- Chronic kidney disease stage 3a  -- Chronic kidney disease stage 3b  -- Chronic kidney disease stage 4  -- Chronic kidney disease stage 5  -- Chronic kidney disease stage 5, requiring dialysis  -- End stage renal disease  -- Other - I will add my own diagnosis  -- Disagree - Not applicable / Not valid  -- Disagree - Clinically Unable to determine / Unknown        PROVIDER RESPONSE TEXT:    The patient has chronic kidney disease stage 4.      Electronically signed by:  Som Gillespie MD 10/3/2024 2:10 PM

## 2024-10-03 NOTE — CARE COORDINATION
Chart reviewed and case reviewed in IDR.  Spoke with LORENA Roman with Palliative care.  Family is requesting a hospice list.  Attempted to meet with patient and family at the bedside.  Patient with eyes closed in bed, but kicking right leg at this time.  Family has left.  Call placed to the patient's daughter Vicky and discussed transition of care planning.  Vicky confirms they would like to review a hospice provider list and plan for the patient to return to [facility] under Hospice care.  Hospice list sent to Vicky via email at [email or web address].  Call placed to Kari, liaison with [facility] and reviewed above.  She will discuss information with her DON.  Plan for patient to return to [facility] to initiate Hospice Care tomorrow if everything can be put in place.  Will continue to follow for further transition of care planning needs.         Shagufta Avila RN.  P:  [phone]

## 2024-10-04 VITALS
WEIGHT: 210 LBS | RESPIRATION RATE: 28 BRPM | SYSTOLIC BLOOD PRESSURE: 146 MMHG | HEIGHT: 68 IN | DIASTOLIC BLOOD PRESSURE: 74 MMHG | HEART RATE: 72 BPM | OXYGEN SATURATION: 94 % | TEMPERATURE: 97.2 F | BODY MASS INDEX: 31.83 KG/M2

## 2024-10-04 PROCEDURE — 99239 HOSP IP/OBS DSCHRG MGMT >30: CPT | Performed by: INTERNAL MEDICINE

## 2024-10-04 PROCEDURE — 6360000002 HC RX W HCPCS: Performed by: CLINICAL NURSE SPECIALIST

## 2024-10-04 PROCEDURE — 99231 SBSQ HOSP IP/OBS SF/LOW 25: CPT | Performed by: INTERNAL MEDICINE

## 2024-10-04 PROCEDURE — 2580000003 HC RX 258: Performed by: FAMILY MEDICINE

## 2024-10-04 PROCEDURE — 6360000002 HC RX W HCPCS: Performed by: INTERNAL MEDICINE

## 2024-10-04 RX ORDER — MORPHINE SULFATE 10 MG/5ML
1 SOLUTION ORAL EVERY 4 HOURS PRN
Qty: 15 ML | Refills: 0 | Status: SHIPPED | OUTPATIENT
Start: 2024-10-04 | End: 2024-10-09

## 2024-10-04 RX ORDER — LORAZEPAM 2 MG/ML
0.5 CONCENTRATE ORAL EVERY 6 HOURS PRN
Qty: 5 ML | Refills: 0 | Status: SHIPPED | OUTPATIENT
Start: 2024-10-04 | End: 2024-10-09

## 2024-10-04 RX ORDER — GLYCOPYRROLATE 1 MG/1
1 TABLET ORAL 3 TIMES DAILY PRN
Qty: 15 TABLET | Refills: 0 | Status: SHIPPED | OUTPATIENT
Start: 2024-10-04 | End: 2024-10-09

## 2024-10-04 RX ADMIN — LEVETIRACETAM 500 MG: 100 INJECTION INTRAVENOUS at 10:42

## 2024-10-04 RX ADMIN — LORAZEPAM 0.5 MG: 2 INJECTION INTRAMUSCULAR; INTRAVENOUS at 12:51

## 2024-10-04 RX ADMIN — SODIUM CHLORIDE, PRESERVATIVE FREE 10 ML: 5 INJECTION INTRAVENOUS at 10:51

## 2024-10-04 RX ADMIN — MORPHINE SULFATE 1 MG: 2 INJECTION, SOLUTION INTRAMUSCULAR; INTRAVENOUS at 10:43

## 2024-10-04 NOTE — PROGRESS NOTES
[facility] Hospitalist Progress Note    SYNOPSIS: Patient admitted on 2024 for Stroke-like symptom    89 y.o. male who presented to [facility]. He was found to be slurring his words. Given this stroke workup was initiated. CT of the neck which showed concern for type III odontoid fracture. Neurosurgery recommended cervical collar. CT head and neck were obtained which showed 50% stenosis of the proximal end of the M2 branch of the left MCA, CTA of the neck showed high-grade stenosis of the origin of the dominant left vertebral artery. 20% stenosis of the proximal right internal carotid and 50% stenosis of the proximal left internal carotid. MRI brain still pending because patient has a pacemaker placed less than 6 weeks ago. Neurosurgery consulted. Palliative care consulted and patient and family electing for hospice at this time. Awaiting placement.     SUBJECTIVE:  Stable overnight. No other overnight issues reported.   Patient seen and examined  Records reviewed.     Temp (24hrs), Av.8 °F (36.6 °C), Min:97.2 °F (36.2 °C), Max:98.6 °F (37 °C)    DIET: ADULT DIET; Dysphagia - Pureed; Moderately Thick (Honey)  CODE: Limited  No intake or output data in the 24 hours ending 10/04/24 0944      Review of Systems  Unable to obtain: mental status       OBJECTIVE:    BP (!) 146/74   Pulse 72   Temp 97.2 °F (36.2 °C) (Temporal)   Resp 22   Ht 1.727 m (5' 8\")   Wt 95.3 kg (210 lb)   SpO2 94%   BMI 31.93 kg/m²     General appearance:  lethargic, difficult to arouse   HEENT:  Conjunctivae/corneas clear.   Neck: Supple. No jugular venous distention.   Respiratory: symmetrical; clear to auscultation bilaterally; no wheezes; no rhonchi; no rales  Cardiovascular: rhythm regular; rate controlled; no murmurs  Abdomen: Soft, nontender, nondistended  Extremities:  peripheral pulses present; no peripheral edema; no

## 2024-10-04 NOTE — DISCHARGE INSTR - COC
Continuity of Care Form    Patient Name: Jorge Luis Chowdhury   :  1933  MRN:  [id number]    Admit date:  2024  Discharge date:  10/4/24    Code Status Order: Limited   Advance Directives:   Advance Care Flowsheet Documentation             Admitting Physician:  Jeronimo Saenz MD  PCP: Sergio Joseph MD    Discharging Nurse: ***  Discharging Hospital Unit/Room#: 8515/8515-A  Discharging Unit Phone Number: [phone]    Emergency Contact:   Extended Emergency Contact Information  Primary Emergency Contact: Demetria Chowdhury/POA  Mobile Phone: [phone]  Relation: Spouse  Secondary Emergency Contact: Vicky Akins/ Janet POA  Home Phone: [phone]  Relation: Child    Past Surgical History:  No past surgical history on file.    Immunization History:   Immunization History   Administered Date(s) Administered    COVID-19, PFIZER PURPLE top, DILUTE for use, (age 12 y+), 30mcg/0.3mL 03/15/2021, 2021       Active Problems:  Patient Active Problem List   Diagnosis Code    Closed fracture of odontoid process of axis (HCC) S12.100A    Stroke-like symptom R29.90    Fall W19.XXXA    Acute ischemic stroke (HCC) I63.9    Goals of care, counseling/discussion Z71.89    Palliative care encounter Z51.5       Isolation/Infection:   Isolation            No Isolation          Patient Infection Status       None to display            Nurse Assessment:  Last Vital Signs: BP (!) 146/74   Pulse 72   Temp 97.2 °F (36.2 °C) (Temporal)   Resp 28   Ht 1.727 m (5' 8\")   Wt 95.3 kg (210 lb)   SpO2 94%   BMI 31.93 kg/m²     Last documented pain score (0-10 scale):    Last Weight:   Wt Readings from Last 1 Encounters:   24 95.3 kg (210 lb)     Mental Status:  disoriented    IV Access:  - None    Nursing Mobility/ADLs:  Walking   Dependent  Transfer  Dependent  Bathing  Dependent  Dressing  Dependent  Toileting  Dependent  Feeding  Dependent  Med Admin  Dependent  Med Delivery   ***    Wound Care Documentation and  transfer of Jorge Luis Chowdhury  is necessary for the continuing treatment of the diagnosis listed and that he requires {Admit to Appropriate Level of Care:85558} for {GREATER/LESS:359578856} 30 days.     Update Admission H&P: {CHP DME Changes in HandP:[id number]}    PHYSICIAN SIGNATURE:  {Esignature:[id number]}

## 2024-10-04 NOTE — DISCHARGE SUMMARY
distress, appears stated age and cooperative.  HEENT: Pupils equal, round, and reactive to light. Conjunctivae/corneas clear.  Neck: Supple, with full range of motion. No jugular venous distention. Trachea midline.  Respiratory:  Normal respiratory effort. Clear to auscultation, bilaterally without Rales/Wheezes/Rhonchi.  Cardiovascular: Regular rate and rhythm with normal S1/S2 without murmurs, rubs or gallops.  Abdomen: Soft, non-tender, non-distended with normal bowel sounds.  Musculoskeletal: No clubbing, cyanosis or edema bilaterally.  Full range of motion without deformity.  Skin: Skin color, texture, turgor normal.  No rashes or lesions.  Neurologic:  Neurovascularly intact without any focal sensory/motor deficits. Cranial nerves: II-XII intact, grossly non-focal.  Psychiatric: Alert and oriented, thought content appropriate, normal insight      Consults:     IP CONSULT TO TRAUMA SURGERY  IP CONSULT TO HOSPITALIST  IP CONSULT TO NEUROLOGY  IP CONSULT TO PALLIATIVE CARE  IP CONSULT TO HOSPICE    Significant Diagnostic Studies:     Echo (TTE) complete (PRN contrast/bubble/strain/3D)    Result Date: 10/1/2024    Left Ventricle: EF by visual approximation is 55%. Left ventricle size is normal. Mild concentric hypertrophy. Indeterminate diastolic function.   Right Ventricle: Right ventricle size is normal. Normal systolic function. TAPSE is 1.8 cm.   Interatrial Septum: Positive bubble study with right to left interatrial shunting.     CTA NECK W CONTRAST    Result Date: 9/30/2024  EXAMINATION: CTA OF THE HEAD WITH CONTRAST WITH PERFUSION; CTA OF THE NECK; CTA OF THE HEAD WITH CONTRAST 9/30/2024 8:26 pm: TECHNIQUE: CTA of the head/brain was performed with the administration of intravenous contrast. Multiplanar reformatted images are provided for review.  MIP images are provided for review. CTA of the neck was performed with the administration of intravenous contrast. Multiplanar reformatted images are provided for  communicating artery. OTHER: No dural venous sinus thrombosis on this non-dedicated study. CT PERFUSION: EXAM QUALITY: The examination is diagnostic with appropriate arterial inflow and venous outflow curves, and diagnostic perfusion maps. CORE INFARCT: The total area of ischemic core is 0 mL (CBF<30% volume). TOTAL HYPOPERFUSION: The total area of hypoperfusion is 0 mL (Tmax>6s volume). PENUMBRA: The penumbra (mismatch) volume is 0 mL and is located in the n/a. The mismatch ratio is n/a.     1. CTA of the head chest focal 50% stenosis of the proximal end of an M2 branch of the left MCA. 2. No additional stenosis or occlusion of the proximal intracranial arteries. 3. CTA of the neck shows high-grade stenosis of the origin of the dominant left vertebral artery. 4. 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  Reconstitution at the C3 level with flow extending from the C3 level into the brain. 5. 20% stenosis of the proximal right internal carotid artery using NASCET criteria. 6. 50% stenosis of the proximal left internal carotid artery using NASCET criteria. 7. No perfusion abnormality.     CT BRAIN PERFUSION    Result Date: 9/30/2024  EXAMINATION: CTA OF THE HEAD WITH CONTRAST WITH PERFUSION; CTA OF THE NECK; CTA OF THE HEAD WITH CONTRAST 9/30/2024 8:26 pm: TECHNIQUE: CTA of the head/brain was performed with the administration of intravenous contrast. Multiplanar reformatted images are provided for review.  MIP images are provided for review. CTA of the neck was performed with the administration of intravenous contrast. Multiplanar reformatted images are provided for review.  MIP images are provided for review. Stenosis of the internal carotid arteries measured using NASCET criteria. Automated exposure control, iterative reconstruction, and/or weight based adjustment of the mA/kV was utilized to reduce the radiation dose to as low as reasonably

## 2024-10-04 NOTE — CARE COORDINATION
Chart reviewed and case reviewed in IDR and with Dr Saenz.  MRI has been canceled.  Patient is medically stable to discharge.  Call placed to the patient's daughter Vicky to discuss transition of care and Hospice choice.  She and her mother discussed it yesterday and they are planning to discuss with [facility] and choose a Hospice provider there.  They would like for the patient to return to return [facility].  Call placed to [facility] re: transition of care planning.  Patient can return today.  They would need the Consult for Hospice care and orders as well as the code status.  Call placed to [facility] and spoke with Jayne.  Ambulance transportation arranged for a 1 pm pick-up time.  Call placed to the patient's wife Demetria to notify of above.  She is in agreement with above.  Kari with [facility] notified of above.  Charge nurse Sean notified of above.  Will continue to follow for further transition of care planning needs.       Shagufta Avila RN.  P:  [phone]

## 2024-10-04 NOTE — PLAN OF CARE
Problem: Safety - Medical Restraint  Goal: Remains free of injury from restraints (Restraint for Interference with Medical Device)  Description: INTERVENTIONS:  1. Determine that other, less restrictive measures have been tried or would not be effective before applying the restraint  2. Evaluate the patient's condition at the time of restraint application  3. Inform patient/family regarding the reason for restraint  4. Q2H: Monitor safety, psychosocial status, comfort, nutrition and hydration  Outcome: Progressing     Problem: Discharge Planning  Goal: Discharge to home or other facility with appropriate resources  Outcome: Progressing     Problem: Pain  Goal: Verbalizes/displays adequate comfort level or baseline comfort level  Outcome: Progressing     Problem: Confusion  Goal: Confusion, delirium, dementia, or psychosis is improved or at baseline  Description: INTERVENTIONS:  1. Assess for possible contributors to thought disturbance, including medications, impaired vision or hearing, underlying metabolic abnormalities, dehydration, psychiatric diagnoses, and notify attending LIP  2. [place] high risk fall precautions, as indicated  3. Provide frequent short contacts to provide reality reorientation, refocusing and direction  4. Decrease environmental stimuli, including noise as appropriate  5. Monitor and intervene to maintain adequate nutrition, hydration, elimination, sleep and activity  6. If unable to ensure safety without constant attention obtain sitter and review sitter guidelines with assigned personnel  7. Initiate Psychosocial CNS and Spiritual Care consult, as indicated  Outcome: Progressing     Problem: Safety - Adult  Goal: Free from fall injury  Outcome: Progressing     Problem: ABCDS Injury Assessment  Goal: Absence of physical injury  Outcome: Progressing     Problem: Skin/Tissue Integrity  Goal: Absence of new skin breakdown  Description: 1.  Monitor for areas of redness and/or skin  breakdown  2.  Assess vascular access sites hourly  3.  Every 4-6 hours minimum:  Change oxygen saturation probe site  4.  Every 4-6 hours:  If on nasal continuous positive airway pressure, respiratory therapy assess nares and determine need for appliance change or resting period.  Outcome: Progressing